# Patient Record
Sex: FEMALE | Race: WHITE | Employment: PART TIME | URBAN - METROPOLITAN AREA
[De-identification: names, ages, dates, MRNs, and addresses within clinical notes are randomized per-mention and may not be internally consistent; named-entity substitution may affect disease eponyms.]

---

## 2019-06-15 ENCOUNTER — APPOINTMENT (OUTPATIENT)
Dept: GENERAL RADIOLOGY | Age: 64
DRG: 192 | End: 2019-06-15
Payer: COMMERCIAL

## 2019-06-15 ENCOUNTER — HOSPITAL ENCOUNTER (INPATIENT)
Age: 64
LOS: 4 days | Discharge: HOME | DRG: 192 | End: 2019-06-19
Attending: EMERGENCY MEDICINE | Admitting: INTERNAL MEDICINE
Payer: COMMERCIAL

## 2019-06-15 DIAGNOSIS — E11.9 TYPE 2 DIABETES MELLITUS WITHOUT COMPLICATION, WITH LONG-TERM CURRENT USE OF INSULIN (HCC): Chronic | ICD-10-CM

## 2019-06-15 DIAGNOSIS — I48.91 ATRIAL FIBRILLATION WITH RAPID VENTRICULAR RESPONSE (HCC): Primary | ICD-10-CM

## 2019-06-15 DIAGNOSIS — Z79.4 TYPE 2 DIABETES MELLITUS WITHOUT COMPLICATION, WITH LONG-TERM CURRENT USE OF INSULIN (HCC): Chronic | ICD-10-CM

## 2019-06-15 PROBLEM — R10.13 EPIGASTRIC PAIN: Status: ACTIVE | Noted: 2019-06-15

## 2019-06-15 PROBLEM — R79.89 ABNORMAL TSH: Status: ACTIVE | Noted: 2019-06-15

## 2019-06-15 LAB
ANION GAP SERPL CALCULATED.3IONS-SCNC: 12 MMOL/L (ref 4–16)
BASOPHILS ABSOLUTE: 0 K/CU MM
BASOPHILS RELATIVE PERCENT: 0.5 % (ref 0–1)
BUN BLDV-MCNC: 12 MG/DL (ref 6–23)
CALCIUM SERPL-MCNC: 9.9 MG/DL (ref 8.3–10.6)
CHLORIDE BLD-SCNC: 105 MMOL/L (ref 99–110)
CO2: 22 MMOL/L (ref 21–32)
CREAT SERPL-MCNC: 0.5 MG/DL (ref 0.6–1.1)
DIFFERENTIAL TYPE: ABNORMAL
EKG ATRIAL RATE: 163 BPM
EKG ATRIAL RATE: 67 BPM
EKG DIAGNOSIS: NORMAL
EKG DIAGNOSIS: NORMAL
EKG P AXIS: 25 DEGREES
EKG P-R INTERVAL: 118 MS
EKG Q-T INTERVAL: 282 MS
EKG Q-T INTERVAL: 370 MS
EKG QRS DURATION: 82 MS
EKG QRS DURATION: 86 MS
EKG QTC CALCULATION (BAZETT): 390 MS
EKG QTC CALCULATION (BAZETT): 471 MS
EKG R AXIS: 12 DEGREES
EKG R AXIS: 22 DEGREES
EKG T AXIS: -16 DEGREES
EKG T AXIS: 22 DEGREES
EKG VENTRICULAR RATE: 168 BPM
EKG VENTRICULAR RATE: 67 BPM
EOSINOPHILS ABSOLUTE: 0.2 K/CU MM
EOSINOPHILS RELATIVE PERCENT: 3.9 % (ref 0–3)
ESTIMATED AVERAGE GLUCOSE: 163 MG/DL
GFR AFRICAN AMERICAN: >60 ML/MIN/1.73M2
GFR NON-AFRICAN AMERICAN: >60 ML/MIN/1.73M2
GLUCOSE BLD-MCNC: 129 MG/DL (ref 70–99)
GLUCOSE BLD-MCNC: 140 MG/DL (ref 70–99)
GLUCOSE BLD-MCNC: 142 MG/DL (ref 70–99)
GLUCOSE BLD-MCNC: 151 MG/DL (ref 70–99)
GLUCOSE BLD-MCNC: 154 MG/DL (ref 70–99)
GLUCOSE BLD-MCNC: 162 MG/DL (ref 70–99)
HBA1C MFR BLD: 7.3 % (ref 4.2–6.3)
HCT VFR BLD CALC: 44.1 % (ref 37–47)
HEMOGLOBIN: 13.9 GM/DL (ref 12.5–16)
IMMATURE NEUTROPHIL %: 0.2 % (ref 0–0.43)
LIPASE: 34 IU/L (ref 13–60)
LYMPHOCYTES ABSOLUTE: 2 K/CU MM
LYMPHOCYTES RELATIVE PERCENT: 34.6 % (ref 24–44)
MCH RBC QN AUTO: 26.6 PG (ref 27–31)
MCHC RBC AUTO-ENTMCNC: 31.5 % (ref 32–36)
MCV RBC AUTO: 84.3 FL (ref 78–100)
MONOCYTES ABSOLUTE: 0.9 K/CU MM
MONOCYTES RELATIVE PERCENT: 15.1 % (ref 0–4)
NUCLEATED RBC %: 0 %
PDW BLD-RTO: 12.6 % (ref 11.7–14.9)
PLATELET # BLD: 221 K/CU MM (ref 140–440)
PMV BLD AUTO: 9 FL (ref 7.5–11.1)
POTASSIUM SERPL-SCNC: 3.9 MMOL/L (ref 3.5–5.1)
RBC # BLD: 5.23 M/CU MM (ref 4.2–5.4)
SEGMENTED NEUTROPHILS ABSOLUTE COUNT: 2.7 K/CU MM
SEGMENTED NEUTROPHILS RELATIVE PERCENT: 45.7 % (ref 36–66)
SODIUM BLD-SCNC: 139 MMOL/L (ref 135–145)
T4 FREE: 1.29 NG/DL (ref 0.9–1.8)
TOTAL IMMATURE NEUTOROPHIL: 0.01 K/CU MM
TOTAL NUCLEATED RBC: 0 K/CU MM
TROPONIN T: <0.01 NG/ML
TSH HIGH SENSITIVITY: <0.01 UIU/ML (ref 0.27–4.2)
WBC # BLD: 5.8 K/CU MM (ref 4–10.5)

## 2019-06-15 PROCEDURE — 93010 ELECTROCARDIOGRAM REPORT: CPT | Performed by: INTERNAL MEDICINE

## 2019-06-15 PROCEDURE — 99285 EMERGENCY DEPT VISIT HI MDM: CPT

## 2019-06-15 PROCEDURE — 2580000003 HC RX 258: Performed by: EMERGENCY MEDICINE

## 2019-06-15 PROCEDURE — 36415 COLL VENOUS BLD VENIPUNCTURE: CPT

## 2019-06-15 PROCEDURE — 83036 HEMOGLOBIN GLYCOSYLATED A1C: CPT

## 2019-06-15 PROCEDURE — 6370000000 HC RX 637 (ALT 250 FOR IP): Performed by: INTERNAL MEDICINE

## 2019-06-15 PROCEDURE — 84443 ASSAY THYROID STIM HORMONE: CPT

## 2019-06-15 PROCEDURE — 82962 GLUCOSE BLOOD TEST: CPT

## 2019-06-15 PROCEDURE — 83690 ASSAY OF LIPASE: CPT

## 2019-06-15 PROCEDURE — 6360000002 HC RX W HCPCS: Performed by: INTERNAL MEDICINE

## 2019-06-15 PROCEDURE — 71045 X-RAY EXAM CHEST 1 VIEW: CPT

## 2019-06-15 PROCEDURE — 96366 THER/PROPH/DIAG IV INF ADDON: CPT

## 2019-06-15 PROCEDURE — 93005 ELECTROCARDIOGRAM TRACING: CPT | Performed by: INTERNAL MEDICINE

## 2019-06-15 PROCEDURE — 2140000000 HC CCU INTERMEDIATE R&B

## 2019-06-15 PROCEDURE — 85025 COMPLETE CBC W/AUTO DIFF WBC: CPT

## 2019-06-15 PROCEDURE — 6370000000 HC RX 637 (ALT 250 FOR IP): Performed by: EMERGENCY MEDICINE

## 2019-06-15 PROCEDURE — 2500000003 HC RX 250 WO HCPCS: Performed by: EMERGENCY MEDICINE

## 2019-06-15 PROCEDURE — 99223 1ST HOSP IP/OBS HIGH 75: CPT | Performed by: INTERNAL MEDICINE

## 2019-06-15 PROCEDURE — 93005 ELECTROCARDIOGRAM TRACING: CPT | Performed by: EMERGENCY MEDICINE

## 2019-06-15 PROCEDURE — 96365 THER/PROPH/DIAG IV INF INIT: CPT

## 2019-06-15 PROCEDURE — 80048 BASIC METABOLIC PNL TOTAL CA: CPT

## 2019-06-15 PROCEDURE — 84439 ASSAY OF FREE THYROXINE: CPT

## 2019-06-15 PROCEDURE — 99221 1ST HOSP IP/OBS SF/LOW 40: CPT | Performed by: INTERNAL MEDICINE

## 2019-06-15 PROCEDURE — 2580000003 HC RX 258: Performed by: INTERNAL MEDICINE

## 2019-06-15 PROCEDURE — 84484 ASSAY OF TROPONIN QUANT: CPT

## 2019-06-15 PROCEDURE — 6370000000 HC RX 637 (ALT 250 FOR IP): Performed by: SPECIALIST

## 2019-06-15 RX ORDER — FENOFIBRATE 134 MG/1
134 CAPSULE ORAL
Status: DISCONTINUED | OUTPATIENT
Start: 2019-06-16 | End: 2019-06-19 | Stop reason: HOSPADM

## 2019-06-15 RX ORDER — NICOTINE POLACRILEX 4 MG
15 LOZENGE BUCCAL PRN
Status: DISCONTINUED | OUTPATIENT
Start: 2019-06-15 | End: 2019-06-19 | Stop reason: HOSPADM

## 2019-06-15 RX ORDER — DICYCLOMINE HYDROCHLORIDE 10 MG/1
20 CAPSULE ORAL 3 TIMES DAILY PRN
Status: DISCONTINUED | OUTPATIENT
Start: 2019-06-15 | End: 2019-06-19 | Stop reason: HOSPADM

## 2019-06-15 RX ORDER — CALCIUM POLYCARBOPHIL 625 MG 625 MG/1
625 TABLET ORAL DAILY
Status: DISCONTINUED | OUTPATIENT
Start: 2019-06-15 | End: 2019-06-19 | Stop reason: HOSPADM

## 2019-06-15 RX ORDER — DILTIAZEM HYDROCHLORIDE 180 MG/1
180 CAPSULE, COATED, EXTENDED RELEASE ORAL DAILY
Status: DISCONTINUED | OUTPATIENT
Start: 2019-06-15 | End: 2019-06-17

## 2019-06-15 RX ORDER — FENOFIBRATE 160 MG/1
160 TABLET ORAL DAILY
Status: DISCONTINUED | OUTPATIENT
Start: 2019-06-15 | End: 2019-06-15 | Stop reason: CLARIF

## 2019-06-15 RX ORDER — DEXTROSE MONOHYDRATE 50 MG/ML
100 INJECTION, SOLUTION INTRAVENOUS PRN
Status: DISCONTINUED | OUTPATIENT
Start: 2019-06-15 | End: 2019-06-19 | Stop reason: HOSPADM

## 2019-06-15 RX ORDER — PROMETHAZINE HYDROCHLORIDE 25 MG/ML
12.5 INJECTION, SOLUTION INTRAMUSCULAR; INTRAVENOUS EVERY 6 HOURS PRN
Status: DISCONTINUED | OUTPATIENT
Start: 2019-06-15 | End: 2019-06-19 | Stop reason: HOSPADM

## 2019-06-15 RX ORDER — PANTOPRAZOLE SODIUM 40 MG/1
40 TABLET, DELAYED RELEASE ORAL
Status: DISCONTINUED | OUTPATIENT
Start: 2019-06-15 | End: 2019-06-19 | Stop reason: HOSPADM

## 2019-06-15 RX ORDER — DOCUSATE SODIUM 100 MG/1
100 CAPSULE, LIQUID FILLED ORAL DAILY
Status: DISCONTINUED | OUTPATIENT
Start: 2019-06-15 | End: 2019-06-19 | Stop reason: HOSPADM

## 2019-06-15 RX ORDER — DILTIAZEM HYDROCHLORIDE 5 MG/ML
20 INJECTION INTRAVENOUS ONCE
Status: COMPLETED | OUTPATIENT
Start: 2019-06-15 | End: 2019-06-15

## 2019-06-15 RX ORDER — OMEGA-3-ACID ETHYL ESTERS 1 G/1
2 CAPSULE, LIQUID FILLED ORAL 2 TIMES DAILY
Status: DISCONTINUED | OUTPATIENT
Start: 2019-06-15 | End: 2019-06-18

## 2019-06-15 RX ORDER — TRAMADOL HYDROCHLORIDE 50 MG/1
50 TABLET ORAL EVERY 6 HOURS PRN
Status: DISCONTINUED | OUTPATIENT
Start: 2019-06-15 | End: 2019-06-19 | Stop reason: HOSPADM

## 2019-06-15 RX ORDER — DILTIAZEM HYDROCHLORIDE 5 MG/ML
30 INJECTION INTRAVENOUS ONCE
Status: COMPLETED | OUTPATIENT
Start: 2019-06-15 | End: 2019-06-15

## 2019-06-15 RX ORDER — ONDANSETRON 2 MG/ML
4 INJECTION INTRAMUSCULAR; INTRAVENOUS EVERY 6 HOURS PRN
Status: DISCONTINUED | OUTPATIENT
Start: 2019-06-15 | End: 2019-06-19 | Stop reason: HOSPADM

## 2019-06-15 RX ORDER — HYDRALAZINE HYDROCHLORIDE 20 MG/ML
5 INJECTION INTRAMUSCULAR; INTRAVENOUS EVERY 6 HOURS PRN
Status: DISCONTINUED | OUTPATIENT
Start: 2019-06-15 | End: 2019-06-16

## 2019-06-15 RX ORDER — ASPIRIN 81 MG/1
81 TABLET ORAL DAILY
Status: DISCONTINUED | OUTPATIENT
Start: 2019-06-15 | End: 2019-06-19 | Stop reason: HOSPADM

## 2019-06-15 RX ORDER — ATORVASTATIN CALCIUM 20 MG/1
20 TABLET, FILM COATED ORAL DAILY
Status: DISCONTINUED | OUTPATIENT
Start: 2019-06-15 | End: 2019-06-19 | Stop reason: HOSPADM

## 2019-06-15 RX ORDER — DEXTROSE MONOHYDRATE 25 G/50ML
12.5 INJECTION, SOLUTION INTRAVENOUS PRN
Status: DISCONTINUED | OUTPATIENT
Start: 2019-06-15 | End: 2019-06-19 | Stop reason: HOSPADM

## 2019-06-15 RX ORDER — SODIUM CHLORIDE 0.9 % (FLUSH) 0.9 %
10 SYRINGE (ML) INJECTION PRN
Status: DISCONTINUED | OUTPATIENT
Start: 2019-06-15 | End: 2019-06-19 | Stop reason: HOSPADM

## 2019-06-15 RX ORDER — DILTIAZEM HYDROCHLORIDE 5 MG/ML
INJECTION INTRAVENOUS
Status: DISPENSED
Start: 2019-06-15 | End: 2019-06-15

## 2019-06-15 RX ORDER — MORPHINE SULFATE 4 MG/ML
1 INJECTION, SOLUTION INTRAMUSCULAR; INTRAVENOUS EVERY 8 HOURS PRN
Status: DISCONTINUED | OUTPATIENT
Start: 2019-06-15 | End: 2019-06-19 | Stop reason: HOSPADM

## 2019-06-15 RX ORDER — ZINC SULFATE 50(220)MG
220 CAPSULE ORAL DAILY
Status: DISCONTINUED | OUTPATIENT
Start: 2019-06-15 | End: 2019-06-19 | Stop reason: HOSPADM

## 2019-06-15 RX ORDER — ACETAMINOPHEN 325 MG/1
325 TABLET ORAL EVERY 6 HOURS PRN
Status: DISCONTINUED | OUTPATIENT
Start: 2019-06-15 | End: 2019-06-17 | Stop reason: SDUPTHER

## 2019-06-15 RX ORDER — LANOLIN ALCOHOL/MO/W.PET/CERES
1000 CREAM (GRAM) TOPICAL NIGHTLY
Status: DISCONTINUED | OUTPATIENT
Start: 2019-06-15 | End: 2019-06-18

## 2019-06-15 RX ORDER — LISINOPRIL AND HYDROCHLOROTHIAZIDE 12.5; 1 MG/1; MG/1
1 TABLET ORAL DAILY
Status: DISCONTINUED | OUTPATIENT
Start: 2019-06-15 | End: 2019-06-17

## 2019-06-15 RX ORDER — MAGNESIUM HYDROXIDE/ALUMINUM HYDROXICE/SIMETHICONE 120; 1200; 1200 MG/30ML; MG/30ML; MG/30ML
30 SUSPENSION ORAL ONCE
Status: COMPLETED | OUTPATIENT
Start: 2019-06-15 | End: 2019-06-15

## 2019-06-15 RX ORDER — FLUTICASONE PROPIONATE 50 MCG
2 SPRAY, SUSPENSION (ML) NASAL DAILY
Status: DISCONTINUED | OUTPATIENT
Start: 2019-06-15 | End: 2019-06-19 | Stop reason: HOSPADM

## 2019-06-15 RX ORDER — DILTIAZEM HYDROCHLORIDE 5 MG/ML
20 INJECTION INTRAVENOUS ONCE
Status: DISCONTINUED | OUTPATIENT
Start: 2019-06-15 | End: 2019-06-15 | Stop reason: SDUPTHER

## 2019-06-15 RX ORDER — INSULIN GLARGINE 100 [IU]/ML
12 INJECTION, SOLUTION SUBCUTANEOUS 2 TIMES DAILY
Status: DISCONTINUED | OUTPATIENT
Start: 2019-06-15 | End: 2019-06-19 | Stop reason: HOSPADM

## 2019-06-15 RX ORDER — DIPHENHYDRAMINE HCL 25 MG
25 TABLET ORAL EVERY 6 HOURS PRN
Status: DISCONTINUED | OUTPATIENT
Start: 2019-06-15 | End: 2019-06-19 | Stop reason: HOSPADM

## 2019-06-15 RX ORDER — SODIUM CHLORIDE 0.9 % (FLUSH) 0.9 %
10 SYRINGE (ML) INJECTION EVERY 12 HOURS SCHEDULED
Status: DISCONTINUED | OUTPATIENT
Start: 2019-06-15 | End: 2019-06-19 | Stop reason: HOSPADM

## 2019-06-15 RX ADMIN — DILTIAZEM HYDROCHLORIDE 20 MG: 5 INJECTION INTRAVENOUS at 03:50

## 2019-06-15 RX ADMIN — ZINC SULFATE 220 MG (50 MG) CAPSULE 220 MG: CAPSULE at 08:40

## 2019-06-15 RX ADMIN — ATORVASTATIN CALCIUM 20 MG: 20 TABLET, FILM COATED ORAL at 21:25

## 2019-06-15 RX ADMIN — TRAMADOL HYDROCHLORIDE 50 MG: 50 TABLET, FILM COATED ORAL at 22:31

## 2019-06-15 RX ADMIN — DICYCLOMINE HYDROCHLORIDE 20 MG: 10 CAPSULE ORAL at 21:40

## 2019-06-15 RX ADMIN — ASPIRIN 81 MG: 81 TABLET ORAL at 21:25

## 2019-06-15 RX ADMIN — TRAMADOL HYDROCHLORIDE 50 MG: 50 TABLET, FILM COATED ORAL at 16:19

## 2019-06-15 RX ADMIN — ACETAMINOPHEN 325 MG: 325 TABLET ORAL at 08:40

## 2019-06-15 RX ADMIN — INSULIN GLARGINE 12 UNITS: 100 INJECTION, SOLUTION SUBCUTANEOUS at 10:45

## 2019-06-15 RX ADMIN — TRAMADOL HYDROCHLORIDE 50 MG: 50 TABLET, FILM COATED ORAL at 10:45

## 2019-06-15 RX ADMIN — ENOXAPARIN SODIUM 100 MG: 100 INJECTION SUBCUTANEOUS at 21:16

## 2019-06-15 RX ADMIN — ENOXAPARIN SODIUM 100 MG: 100 INJECTION SUBCUTANEOUS at 08:40

## 2019-06-15 RX ADMIN — DIPHENHYDRAMINE HCL 25 MG: 25 TABLET ORAL at 05:14

## 2019-06-15 RX ADMIN — DILTIAZEM HYDROCHLORIDE 180 MG: 180 CAPSULE, COATED, EXTENDED RELEASE ORAL at 08:40

## 2019-06-15 RX ADMIN — CALCIUM POLYCARBOPHIL 625 MG TABLET 625 MG: at 08:40

## 2019-06-15 RX ADMIN — HYOSCYAMINE SULFATE 125 MCG: 0.12 TABLET ORAL; SUBLINGUAL at 23:23

## 2019-06-15 RX ADMIN — ACETAMINOPHEN 325 MG: 325 TABLET ORAL at 15:02

## 2019-06-15 RX ADMIN — ONDANSETRON 4 MG: 2 INJECTION INTRAMUSCULAR; INTRAVENOUS at 22:15

## 2019-06-15 RX ADMIN — INSULIN LISPRO 1 UNITS: 100 INJECTION, SOLUTION INTRAVENOUS; SUBCUTANEOUS at 16:45

## 2019-06-15 RX ADMIN — INSULIN LISPRO 1 UNITS: 100 INJECTION, SOLUTION INTRAVENOUS; SUBCUTANEOUS at 08:52

## 2019-06-15 RX ADMIN — DILTIAZEM HYDROCHLORIDE 30 MG: 5 INJECTION INTRAVENOUS at 04:19

## 2019-06-15 RX ADMIN — PANTOPRAZOLE SODIUM 40 MG: 40 TABLET, DELAYED RELEASE ORAL at 08:40

## 2019-06-15 RX ADMIN — ALUMINUM HYDROXIDE, MAGNESIUM HYDROXIDE, AND SIMETHICONE 30 ML: 200; 200; 20 SUSPENSION ORAL at 23:08

## 2019-06-15 RX ADMIN — DILTIAZEM HYDROCHLORIDE 5 MG/HR: 5 INJECTION INTRAVENOUS at 03:56

## 2019-06-15 RX ADMIN — SODIUM CHLORIDE, PRESERVATIVE FREE 10 ML: 5 INJECTION INTRAVENOUS at 21:43

## 2019-06-15 RX ADMIN — MORPHINE SULFATE 1 MG: 4 INJECTION INTRAVENOUS at 21:29

## 2019-06-15 RX ADMIN — DOCUSATE SODIUM 100 MG: 100 CAPSULE, LIQUID FILLED ORAL at 21:00

## 2019-06-15 RX ADMIN — INSULIN GLARGINE 12 UNITS: 100 INJECTION, SOLUTION SUBCUTANEOUS at 21:30

## 2019-06-15 ASSESSMENT — PAIN SCALES - GENERAL
PAINLEVEL_OUTOF10: 8
PAINLEVEL_OUTOF10: 7
PAINLEVEL_OUTOF10: 8
PAINLEVEL_OUTOF10: 6
PAINLEVEL_OUTOF10: 5
PAINLEVEL_OUTOF10: 9

## 2019-06-15 ASSESSMENT — PAIN DESCRIPTION - LOCATION: LOCATION: CHEST

## 2019-06-15 NOTE — ED TRIAGE NOTES
Patient arrived via EMS for A-fib and chest tightness.   Patient stated that it woke her up from her sleep and that she has a history of a-fib

## 2019-06-15 NOTE — PROGRESS NOTES
2 person skin assessment completed with this nurse and [facility], RN. No areas of skin impairment noted at this time.

## 2019-06-15 NOTE — H&P
fluticasone (FLONASE) 50 MCG/ACT nasal spray 2 sprays by Nasal route daily 1/29/16   Fanta Matos MD   docusate (COLACE, DULCOLAX) 100 MG CAPS Take 100 mg by mouth daily 1/29/16   Fanta Matos MD   insulin detemir (LEVEMIR) 100 UNIT/ML injection pen Inject 50 Units into the skin nightly 1/29/16   Fanta Matos MD   lisinopril-hydrochlorothiazide (PRINZIDE;ZESTORETIC) 10-12.5 MG per tablet Take 1 tablet by mouth daily    Historical Provider, MD   metFORMIN (GLUCOPHAGE) 500 MG tablet Take 1 tablet by mouth 2 times daily (with meals). 3/18/15   April Small MD   atorvastatin (LIPITOR) 20 MG tablet Take 1 tablet by mouth daily. 3/18/15   April Small MD   omeprazole (PRILOSEC) 20 MG capsule Take 20 mg by mouth daily. Historical Provider, MD   polycarbophil (FIBERCON) 625 MG tablet Take 625 mg by mouth daily. Historical Provider, MD   Zinc 50 MG CAPS Take 1 capsule by mouth daily. Historical Provider, MD   Niacin 1000 MG TBCR Take 1 tablet by mouth nightly     Historical Provider, MD   Acetaminophen (TYLENOL ARTHRITIS EXT RELIEF PO) Take 650 mg by mouth 2 times daily. Historical Provider, MD   aspirin 81 MG EC tablet Take 81 mg by mouth daily. Last dose 8-15-12    Historical Provider, MD   choline fenofibrate (TRILIPIX) 135 MG CPDR Take 160 mg by mouth daily     Historical Provider, MD       Allergies:  Cephalexin; Hydromorphone; Keflex [cephalexin]; Percocet [oxycodone-acetaminophen]; Sulfa antibiotics; Unable to assess; and Vicodin [hydrocodone-acetaminophen]    Social History:   TOBACCO:   reports that she has never smoked. She does not have any smokeless tobacco history on file. ETOH:   reports that she does not drink alcohol.         Family History:       Problem Relation Age of Onset    Dementia Mother     High Cholesterol Father     Cancer Father        REVIEW OF SYSTEMS:  Negative except for above    Physical Exam:    Vitals: /75   Pulse 66   Temp 98 °F (36.7 °C) (Oral) Resp 16   Ht 5' 9\" (1.753 m)   Wt 210 lb 6.4 oz (95.4 kg)   SpO2 95%   BMI 31.07 kg/m²   General appearance: alert, appears stated age and cooperative  Skin: Skin color, texture, turgor normal. No rashes or lesions  HEENT: Head: Normocephalic, no lesions, without obvious abnormality. Eye: Normal external eye, conjunctiva, lids cornea, JARED. Nose: Normal external nose, mucus membranes and septum. Pharynx: Dental Hygiene adequate. Normal buccal mucosa. Normal pharynx. HAS SWELLING BILAT SUBMANDIBULAR REGIONS, ? POSSIBLY FR HX OF SIALOADENITIS PER PRIOR ENT EVAL    Lungs: clear to auscultation bilaterally  Heart: regular rate and rhythm, S1, S2 normal, no murmur, click, rub or gallop  Abdomen: soft, non-tender; bowel sounds normal; no masses,  no organomegaly  Extremities: extremities normal, atraumatic, no cyanosis or edema  Neurologic: Mental status: Alert, oriented, thought content appropriate    CBC:   Recent Labs     06/15/19  0338   WBC 5.8   HGB 13.9        BMP:    Recent Labs     06/15/19  0338      K 3.9      CO2 22   BUN 12   CREATININE 0.5*   GLUCOSE 162*     Hepatic: No results for input(s): AST, ALT, ALB, BILITOT, ALKPHOS in the last 72 hours. Troponin: No results for input(s): TROPONINI in the last 72 hours. BNP: No results for input(s): BNP in the last 72 hours. Lipids: No results for input(s): CHOL, HDL in the last 72 hours. Invalid input(s): LDLCALCU  ABGs: No results found for: PHART, PO2ART, KGV9NFE  INR: No results for input(s): INR in the last 72 hours.   -----------------------------------------------------------------       Assessment and Plan     Patient Active Problem List   Diagnosis Code    DM type 2 (diabetes mellitus, type 2) ([facility] E11.9    Hypercholesterolemia E78.00    HTN (hypertension) I10    Idiopathic acute pancreatitis K85.00    Acute pancreatitis K85.90    Type 2 diabetes mellitus without complication ([facility] J88.3    Essential hypertension I10

## 2019-06-15 NOTE — CONSULTS
Vomiting    Sulfa Antibiotics Hives    Unable To Assess Nausea And Vomiting     States pain medications cause severe vomiting      Vicodin [Hydrocodone-Acetaminophen] Nausea And Vomiting         diltiazem 125 mg in dextrose 5 % 125 mL infusion Continuous   diphenhydrAMINE (BENADRYL) tablet 25 mg Q6H PRN   aspirin EC tablet 81 mg Daily   atorvastatin (LIPITOR) tablet 20 mg Daily   fenofibrate tablet 160 mg Daily   docusate sodium (COLACE) capsule 100 mg Daily   fluticasone (FLONASE) 50 MCG/ACT nasal spray 2 spray Daily   insulin detemir (LEVEMIR) injection vial 50 Units Nightly   insulin lispro (HUMALOG) injection vial 15 Units TID    lisinopril-hydrochlorothiazide (PRINZIDE;ZESTORETIC) 10-12.5 MG per tablet 1 tablet Daily   niacin (SLO-NIACIN) extended release tablet 1,000 mg Nightly   omega-3 acid ethyl esters (LOVAZA) capsule 2 g BID   pantoprazole (PROTONIX) tablet 40 mg QAM AC   polycarbophil (FIBERCON) tablet 625 mg Daily   zinc sulfate (ZINCATE) capsule 220 mg Daily   sodium chloride flush 0.9 % injection 10 mL 2 times per day   sodium chloride flush 0.9 % injection 10 mL PRN   magnesium hydroxide (MILK OF MAGNESIA) 400 MG/5ML suspension 30 mL Daily PRN   ondansetron (ZOFRAN) injection 4 mg Q6H PRN   glucose (GLUTOSE) 40 % oral gel 15 g PRN   dextrose 50 % IV solution PRN   glucagon (rDNA) injection 1 mg PRN   dextrose 5 % solution PRN   enoxaparin (LOVENOX) injection 100 mg BID   insulin lispro (HUMALOG) injection vial 0-6 Units TID    insulin lispro (HUMALOG) injection vial 0-3 Units Nightly   acetaminophen (TYLENOL) tablet 325 mg Q6H PRN     Current Facility-Administered Medications   Medication Dose Route Frequency Provider Last Rate Last Dose    diltiazem 125 mg in dextrose 5 % 125 mL infusion  5 mg/hr Intravenous Continuous Vance Parry MD 7.5 mL/hr at 06/15/19 0735 7.5 mg/hr at 06/15/19 0735    diphenhydrAMINE (BENADRYL) tablet 25 mg  25 mg Oral Q6H PRN Sathya Delong MD   25 mg at 06/15/19 solution  100 mL/hr Intravenous PRN Andrea Francisco MD        enoxaparin (LOVENOX) injection 100 mg  1 mg/kg Subcutaneous BID Andrea Francisco MD        insulin lispro (HUMALOG) injection vial 0-6 Units  0-6 Units Subcutaneous TID WC Andrea Francisco MD        insulin lispro (HUMALOG) injection vial 0-3 Units  0-3 Units Subcutaneous Nightly Andrea Francisco MD        acetaminophen (TYLENOL) tablet 325 mg  325 mg Oral Q6H PRN Andrea Francisco MD         Review of Systems:   · Constitutional: No Fever or Weight Loss   · Eyes: No Decreased Vision  · ENT: No Headaches, Hearing Loss or Vertigo  · Cardiovascular: As per HPI  · Respiratory: As per HPI  · Gastrointestinal:has h/o pancreatitis  No abdominal pain, appetite loss, blood in stools, constipation, diarrhea or heartburn  · Genitourinary: No dysuria, trouble voiding, or hematuria  · Musculoskeletal:  No gait disturbance, weakness or joint complaints  · Integumentary: No rash or pruritis  · Neurological: No TIA or stroke symptoms  · Psychiatric: No anxiety or depression  · Endocrine: No malaise, fatigue or temperature intolerance  · Hematologic/Lymphatic: No bleeding problems, blood clots or swollen lymph nodes  · Allergic/Immunologic: No nasal congestion or hives  All systems negative except as marked. Physical Examination:    Vitals:    06/15/19 0433 06/15/19 0519 06/15/19 0533 06/15/19 0600   BP: (!) 140/85 114/85 123/78 109/75   Pulse: 121 119 124 66   Resp: 21 28 22 16   Temp:    98 °F (36.7 °C)   TempSrc:    Oral   SpO2: 97% 97% 95%    Weight:    210 lb 6.4 oz (95.4 kg)   Height:    5' 9\" (1.753 m)       General Appearance:  No distress, conversant    Constitutional:  Well developed, Well nourished, No acute distress, Non-toxic appearance.    HENT:  Normocephalic, Atraumatic, Bilateral external ears normal, Oropharynx moist, No oral exudates, Nose normal. Neck- Normal range of motion, No tenderness, Supple, No stridor,no apical-carotid delay  Lymphatics : no palpable lymph nodes  Eyes:  PERRL, EOMI, Conjunctiva normal, No discharge. Respiratory:  Normal breath sounds, No respiratory distress, No wheezing, No chest tenderness. ,no use of accessory muscles, crackles Absent   Cardiovascular: (PMI) apex non displaced,no lifts no thrills, ankle swelling Absent  , 1+, s1 and s2 audible,Murmur. Absent , JVD not noted    Abdomen /GI:  Bowel sounds normal, Soft, No tenderness, No masses, No gross visceromegaly   :  No costovertebral angle tenderness   Musculoskeletal:  No edema, no tenderness, no deformities. Back- no tenderness  Integument:  Well hydrated, no rash   Lymphatic:  No lymphadenopathy noted   Neurologic:  Alert & oriented x 3, CN 2-12 normal, normal motor function, normal sensory function, no focal deficits noted           Medical decision making and Data review:    Lab Review   Recent Labs     06/15/19  0338   WBC 5.8   HGB 13.9   HCT 44.1         Recent Labs     06/15/19  0338      K 3.9      CO2 22   BUN 12   CREATININE 0.5*     No results for input(s): AST, ALT, ALB, BILIDIR, BILITOT, ALKPHOS in the last 72 hours. Recent Labs     06/15/19  0338   TROPONINT <0.010       No results for input(s): PROBNP in the last 72 hours. Lab Results   Component Value Date    INR 1.07 01/19/2016    PROTIME 12.2 01/19/2016       EKG: (reviewed by myself)    ECHO:(reviewed by myself)    Chest Xray:(reviewed by myself)  Xr Chest Portable    Result Date: 6/15/2019  EXAMINATION: ONE XRAY VIEW OF THE CHEST 6/15/2019 3:43 am COMPARISON: 01/24/2016. HISTORY: ORDERING SYSTEM PROVIDED HISTORY: chest pain TECHNOLOGIST PROVIDED HISTORY: Reason for exam:->chest pain Ordering Physician Provided Reason for Exam: chest pain Acuity: Unknown Type of Exam: Unknown FINDINGS: Lungs are clear. Cardiac and mediastinal silhouettes are within normal limits. No pneumothoraces. Bony structures appear intact.      No evidence for acute cardiopulmonary process. All labs, medications and tests reviewed by myself including data  from outside source , patient and available family . Continue all other medications of all above medical condition listed as is. Impression:  Principal Problem:    Atrial fibrillation with RVR (HCC)  Active Problems:    DM type 2 (diabetes mellitus, type 2) ([facility]  Resolved Problems:    * No resolved hospital problems. *      Assessment: 58 y. o.year old with PMH of  has a past medical history of Arrhythmia, Arrhythmia, Atrial fibrillation ([facility] Back injury, Diabetes mellitus ([facility] Hypercholesteremia, Hyperlipidemia, Hypertension, Lumbar herniated disc, Nausea & vomiting, and Thyroid nodule. Plan and Recommendations:    Afib: in sinus now. Start po cardizem cd she has been intolerant of beta blockers with hr lowering too much in past   Check echo , stress and sleep apnea ( can be done as outpatient), check tsh   Chads vasc of 3 start eliquis we discussed risk vs benefit of anti coagulation . She is in agreement , stop aspirin unless stress test comes back abnormal   We will hold off on antiarrhythmic until we know the results of her stress test  DVT prophylaxis if no contraindication  6. Dyslipidemia: continue statins   Follow-up with Dr.Ashfaq Camacho and Raffy Almanzar ( I am covering their service / on call )  Discussed with patient in detail,          Thank you  much for consult and giving us the opportunity in contributing in the care of this patient. Please feel free to call me for any questions.        Pranay Alegre MD, 6/15/2019 8:18 AM

## 2019-06-15 NOTE — ED NOTES
Bed: ED-30  Expected date:   Expected time:   Means of arrival:   Comments:  ems     Zachary Vu RN  06/15/19 2151

## 2019-06-15 NOTE — ED NOTES
Called pharmacy to verify cardizem bolus. States is going to reorder it.       Facundo Cruz, MALLORIE  06/15/19 9314
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
## 2019-06-16 LAB
ALBUMIN SERPL-MCNC: 4.2 GM/DL (ref 3.4–5)
ALP BLD-CCNC: 83 IU/L (ref 40–128)
ALT SERPL-CCNC: 35 U/L (ref 10–40)
AMYLASE: 46 U/L (ref 25–115)
ANION GAP SERPL CALCULATED.3IONS-SCNC: 13 MMOL/L (ref 4–16)
AST SERPL-CCNC: 27 IU/L (ref 15–37)
BASOPHILS ABSOLUTE: 0 K/CU MM
BASOPHILS RELATIVE PERCENT: 0.2 % (ref 0–1)
BILIRUB SERPL-MCNC: 0.4 MG/DL (ref 0–1)
BUN BLDV-MCNC: 15 MG/DL (ref 6–23)
CALCIUM SERPL-MCNC: 9.2 MG/DL (ref 8.3–10.6)
CHLORIDE BLD-SCNC: 103 MMOL/L (ref 99–110)
CO2: 23 MMOL/L (ref 21–32)
CREAT SERPL-MCNC: 0.6 MG/DL (ref 0.6–1.1)
DIFFERENTIAL TYPE: ABNORMAL
EKG ATRIAL RATE: 62 BPM
EKG ATRIAL RATE: 66 BPM
EKG DIAGNOSIS: NORMAL
EKG DIAGNOSIS: NORMAL
EKG P AXIS: 21 DEGREES
EKG P AXIS: 26 DEGREES
EKG P-R INTERVAL: 146 MS
EKG P-R INTERVAL: 152 MS
EKG Q-T INTERVAL: 400 MS
EKG Q-T INTERVAL: 416 MS
EKG QRS DURATION: 84 MS
EKG QRS DURATION: 84 MS
EKG QTC CALCULATION (BAZETT): 406 MS
EKG QTC CALCULATION (BAZETT): 436 MS
EKG R AXIS: 25 DEGREES
EKG R AXIS: 28 DEGREES
EKG T AXIS: 24 DEGREES
EKG T AXIS: 28 DEGREES
EKG VENTRICULAR RATE: 62 BPM
EKG VENTRICULAR RATE: 66 BPM
EOSINOPHILS ABSOLUTE: 0 K/CU MM
EOSINOPHILS RELATIVE PERCENT: 0.5 % (ref 0–3)
GFR AFRICAN AMERICAN: >60 ML/MIN/1.73M2
GFR NON-AFRICAN AMERICAN: >60 ML/MIN/1.73M2
GLUCOSE BLD-MCNC: 120 MG/DL (ref 70–99)
GLUCOSE BLD-MCNC: 142 MG/DL (ref 70–99)
GLUCOSE BLD-MCNC: 161 MG/DL (ref 70–99)
GLUCOSE BLD-MCNC: 176 MG/DL (ref 70–99)
GLUCOSE BLD-MCNC: 217 MG/DL (ref 70–99)
HCT VFR BLD CALC: 41.2 % (ref 37–47)
HEMOGLOBIN: 12.8 GM/DL (ref 12.5–16)
IMMATURE NEUTROPHIL %: 0.1 % (ref 0–0.43)
LIPASE: 21 IU/L (ref 13–60)
LYMPHOCYTES ABSOLUTE: 1.6 K/CU MM
LYMPHOCYTES RELATIVE PERCENT: 18.8 % (ref 24–44)
MCH RBC QN AUTO: 26.6 PG (ref 27–31)
MCHC RBC AUTO-ENTMCNC: 31.1 % (ref 32–36)
MCV RBC AUTO: 85.5 FL (ref 78–100)
MONOCYTES ABSOLUTE: 0.8 K/CU MM
MONOCYTES RELATIVE PERCENT: 9.6 % (ref 0–4)
NUCLEATED RBC %: 0 %
PDW BLD-RTO: 12.9 % (ref 11.7–14.9)
PLATELET # BLD: 202 K/CU MM (ref 140–440)
PMV BLD AUTO: 8.9 FL (ref 7.5–11.1)
POTASSIUM SERPL-SCNC: 4 MMOL/L (ref 3.5–5.1)
RBC # BLD: 4.82 M/CU MM (ref 4.2–5.4)
SEGMENTED NEUTROPHILS ABSOLUTE COUNT: 5.9 K/CU MM
SEGMENTED NEUTROPHILS RELATIVE PERCENT: 70.8 % (ref 36–66)
SODIUM BLD-SCNC: 139 MMOL/L (ref 135–145)
TOTAL IMMATURE NEUTOROPHIL: 0.01 K/CU MM
TOTAL NUCLEATED RBC: 0 K/CU MM
TOTAL PROTEIN: 6.8 GM/DL (ref 6.4–8.2)
TROPONIN T: <0.01 NG/ML
TSH HIGH SENSITIVITY: <0.01 UIU/ML (ref 0.27–4.2)
WBC # BLD: 8.3 K/CU MM (ref 4–10.5)

## 2019-06-16 PROCEDURE — 36415 COLL VENOUS BLD VENIPUNCTURE: CPT

## 2019-06-16 PROCEDURE — 6360000002 HC RX W HCPCS: Performed by: INTERNAL MEDICINE

## 2019-06-16 PROCEDURE — 83690 ASSAY OF LIPASE: CPT

## 2019-06-16 PROCEDURE — 85025 COMPLETE CBC W/AUTO DIFF WBC: CPT

## 2019-06-16 PROCEDURE — 84443 ASSAY THYROID STIM HORMONE: CPT

## 2019-06-16 PROCEDURE — 2140000000 HC CCU INTERMEDIATE R&B

## 2019-06-16 PROCEDURE — 80053 COMPREHEN METABOLIC PANEL: CPT

## 2019-06-16 PROCEDURE — 99232 SBSQ HOSP IP/OBS MODERATE 35: CPT | Performed by: INTERNAL MEDICINE

## 2019-06-16 PROCEDURE — 82962 GLUCOSE BLOOD TEST: CPT

## 2019-06-16 PROCEDURE — 84484 ASSAY OF TROPONIN QUANT: CPT

## 2019-06-16 PROCEDURE — 6370000000 HC RX 637 (ALT 250 FOR IP): Performed by: INTERNAL MEDICINE

## 2019-06-16 PROCEDURE — 82150 ASSAY OF AMYLASE: CPT

## 2019-06-16 PROCEDURE — 93010 ELECTROCARDIOGRAM REPORT: CPT | Performed by: INTERNAL MEDICINE

## 2019-06-16 PROCEDURE — 2580000003 HC RX 258: Performed by: INTERNAL MEDICINE

## 2019-06-16 RX ORDER — HYDRALAZINE HYDROCHLORIDE 20 MG/ML
5 INJECTION INTRAMUSCULAR; INTRAVENOUS EVERY 6 HOURS PRN
Status: DISCONTINUED | OUTPATIENT
Start: 2019-06-15 | End: 2019-06-17

## 2019-06-16 RX ADMIN — TRAMADOL HYDROCHLORIDE 50 MG: 50 TABLET, FILM COATED ORAL at 22:30

## 2019-06-16 RX ADMIN — SODIUM CHLORIDE, PRESERVATIVE FREE 10 ML: 5 INJECTION INTRAVENOUS at 21:03

## 2019-06-16 RX ADMIN — PROMETHAZINE HYDROCHLORIDE 12.5 MG: 25 INJECTION INTRAMUSCULAR; INTRAVENOUS at 02:33

## 2019-06-16 RX ADMIN — DILTIAZEM HYDROCHLORIDE 180 MG: 180 CAPSULE, COATED, EXTENDED RELEASE ORAL at 08:14

## 2019-06-16 RX ADMIN — TRAMADOL HYDROCHLORIDE 50 MG: 50 TABLET, FILM COATED ORAL at 14:50

## 2019-06-16 RX ADMIN — MAGNESIUM HYDROXIDE 30 ML: 400 SUSPENSION ORAL at 16:32

## 2019-06-16 RX ADMIN — PANTOPRAZOLE SODIUM 40 MG: 40 TABLET, DELAYED RELEASE ORAL at 05:56

## 2019-06-16 RX ADMIN — FENOFIBRATE 134 MG: 134 CAPSULE ORAL at 08:14

## 2019-06-16 RX ADMIN — DOCUSATE SODIUM 100 MG: 100 CAPSULE, LIQUID FILLED ORAL at 08:14

## 2019-06-16 RX ADMIN — CALCIUM POLYCARBOPHIL 625 MG TABLET 625 MG: at 08:14

## 2019-06-16 RX ADMIN — INSULIN LISPRO 2 UNITS: 100 INJECTION, SOLUTION INTRAVENOUS; SUBCUTANEOUS at 11:39

## 2019-06-16 RX ADMIN — ASPIRIN 81 MG: 81 TABLET ORAL at 21:00

## 2019-06-16 RX ADMIN — HYDRALAZINE HYDROCHLORIDE 5 MG: 20 INJECTION INTRAMUSCULAR; INTRAVENOUS at 00:28

## 2019-06-16 RX ADMIN — ATORVASTATIN CALCIUM 20 MG: 20 TABLET, FILM COATED ORAL at 21:00

## 2019-06-16 RX ADMIN — INSULIN LISPRO 1 UNITS: 100 INJECTION, SOLUTION INTRAVENOUS; SUBCUTANEOUS at 21:11

## 2019-06-16 RX ADMIN — ENOXAPARIN SODIUM 100 MG: 100 INJECTION SUBCUTANEOUS at 21:00

## 2019-06-16 RX ADMIN — ZINC SULFATE 220 MG (50 MG) CAPSULE 220 MG: CAPSULE at 08:14

## 2019-06-16 RX ADMIN — INSULIN GLARGINE 12 UNITS: 100 INJECTION, SOLUTION SUBCUTANEOUS at 21:08

## 2019-06-16 RX ADMIN — ACETAMINOPHEN 325 MG: 325 TABLET ORAL at 08:14

## 2019-06-16 RX ADMIN — INSULIN GLARGINE 12 UNITS: 100 INJECTION, SOLUTION SUBCUTANEOUS at 08:16

## 2019-06-16 RX ADMIN — ENOXAPARIN SODIUM 100 MG: 100 INJECTION SUBCUTANEOUS at 08:14

## 2019-06-16 ASSESSMENT — PAIN SCALES - GENERAL
PAINLEVEL_OUTOF10: 5
PAINLEVEL_OUTOF10: 6
PAINLEVEL_OUTOF10: 5

## 2019-06-16 NOTE — CONSULTS
[address] [place] [address], [address] [place] [address]                                  CONSULTATION    PATIENT NAME: Juan Ramon Baldwin                        :        1955  MED REC NO:   [id number]                          ROOM:       [id number]  ACCOUNT NO:   [de-identified]                           ADMIT DATE: 06/15/2019  PROVIDER:     Ammy Ruby MD    CONSULT DATE:  06/15/2019    PRIMARY CARE PROVIDER:  Milton Alexis MD    CHIEF COMPLAINT:  Upper abdominal pain. HISTORY OF PRESENT ILLNESS:  The patient is a 54-year-old white female,  patient known to me very well, with past medical history significant for  hypertension, diabetes mellitus, atrial fibrillation, back injury,  thyroid nodule, and hyperlipidemia, who presented to the emergency room  this morning with palpitation and sweats. The patient was noted to be  in AFib with rapid ventricular rate and she was started on Cardizem and  heart rate was stabilized. The patient is being followed up by the  Cardiology consultant, Dr. Ayaka Perez. The patient also gives history of upper abdominal pain radiating to the  back along with nauseated feeling off and on for the past few months. There is no history of vomiting, hematemesis, melena, hematochezia,  anorexia, or weight loss. The blood workup done today comprised a chem  profile and a CBC, which was within normal limits. The patient's LFTs  are pending. The patient did have a moderate-to-severe episode of acute  pancreatitis in 2016. The patient subsequently saw Dr. Jorden Burks and  had a lap cholecystectomy done. Of note, the patient did not have  gallstones. The patient has not had further episodes of acute  pancreatitis since. The patient did have about four colonoscopies done  in the past and the last colonoscopy was in . The patient had colon  polyps removed.   The patient also has had an EGD done and a small bowel  follow through as well for workup of her abdominal complaints. The  patient is hemodynamically stable at present. REVIEW OF SYSTEMS:  CENTRAL NERVOUS SYSTEM:  The patient denies headache or focal  sensorimotor symptoms. CARDIOVASCULAR SYSTEM:  No history of chest pain, but the patient  complains of \"pounding sensation\" in her chest along with sweats. There  is no history of leg swelling. GENITOURINARY SYSTEM:  No history of dysuria, pyuria, or hematuria. MUSCULOSKELETAL SYSTEM:  No history of aches and pains in muscles and  joints. RESPIRATORY SYSTEM:  No history of cough, hemoptysis, fever, or chills. PAST MEDICAL HISTORY:  Significant for history of hypertension, diabetes  mellitus, atrial fibrillation, back injury, thyroid nodule, and  hyperlipidemia. FAMILY HISTORY:  The patient's father was diagnosed with non-Hodgkin's  lymphoma. MEDICATIONS:  Please refer to the chart. SOCIOECONOMIC HISTORY:  No history of EtOH abuse. The patient does not  smoke cigarettes. PAST SURGICAL HISTORY:  The patient has had hysterectomy done, umbilical  herniorrhaphy, tonsillectomy, cholecystectomy, and laser treatment for  her varicose veins. ALLERGIES:  The patient is allergic to CEPHALEXIN, KEFLEX, SULFA  ANTIBIOTICS, HYDROMORPHONE, PERCOCET, and VICODIN. PHYSICAL EXAMINATION:  GENERAL:  Shows a 54-year-old white female of average build and  nutritional status who is lying comfortably flat in bed, in no acute  distress. She is awake, alert, and oriented and pleasant to talk with. VITAL SIGNS:  Stable. HEENT:  Shows skull to be atraumatic. NECK:  Supple. CHEST:  Clear. HEART:  S1 and S2 are normal.  ABDOMEN:  Soft, obese, and nontender. Liver and spleen are not  palpable. Bowel sounds are present. RECTAL:  Deferred. CNS:  Shows the patient to be awake, alert, and oriented. There are no  focal sensorimotor signs. MUSCULOSKELETAL SYSTEM:  Unremarkable. LABORATORY DATA:  As above mentioned. IMPRESSION:  1.   A 62-year-old white female, presents with palpitations and sweats  and is noted to have AFib with rapid ventricular rate. The patient also  complains of nonspecific upper abdominal pain radiating to the back with  nausea for the past few months, rule out gastritis. 2.  Rule out IBS. RECOMMENDATION:  1. Agree with present management with Protonix. 2.  The patient is instructed to continue to take Bentyl/Levsin  sublingual on a p.r.n. basis. 3.  We will check CBC, chem profile, amylase, and lipase level in the  a.m.  4.  The patient has been instructed to call the office after discharge  for outpatient followup EGD, in fact once her cardiac workup is  completed. 5.  No need for colonoscopy at this point. 6.  The case and plan have been discussed in detail with the patient and  her .         Jasmin Felipe MD    D: 06/15/2019 17:39:24       T: 06/15/2019 19:27:22     [email or web address]  Job#: [id number]     Doc#: [id number]    CC:  Milton Alexis MD

## 2019-06-16 NOTE — PROGRESS NOTES
DOING BETTER HAD AN EPISODE OF SEVERE UPPER ABD PAIN RADIATING THE THE BACK YESTERDAY NO VOMITING OR GROSS  GIT BLEEDING  VITALS STABLE   LABS NOTED   HOME IN AM WILL F/U AS OUTPT

## 2019-06-16 NOTE — PROGRESS NOTES
Pt had a severe episode of pain in her upper abd. Episode lasted about 20-30mins. She described pain as a sharp pain going straight through her to her back. Pt was sweating, pale and unable to get comfortable. This was right after giving pt 1mg of morphine. We went ahead and gave zofran to relieve some of the nausea. Completed EKG. Resulted normal. Called Dr. Nahomy Cruz He ordered Maalox and phenergen, also New labs to obtain in am.   After pt started to feel some relief, some of the pain started to come back. BP started to become elevated 192/92 (120). Completed new EKG. Showed sinus arrhythmia with some pjc's. Called Dr. Jenniffer Rome again and got hydralazine ordered for BP.

## 2019-06-16 NOTE — PROGRESS NOTES
IM Progress Note  6/16/2019 10:24 AM  Subjective:   Admit Date: 6/15/2019  PCP: Henderson Snellen, MD  Chief complaint- PALPITATIONS    Interval History: no recurring afib this am and HR stable on oral cardizem. Had severe bout epigastric pain for about an hr yest which resolved after pain meds. GI started on levsin for IBS        Data:   Scheduled Meds:   aspirin  81 mg Oral Daily    atorvastatin  20 mg Oral Daily    docusate sodium  100 mg Oral Daily    fluticasone  2 spray Nasal Daily    lisinopril-hydrochlorothiazide  1 tablet Oral Daily    niacin  1,000 mg Oral Nightly    omega-3 acid ethyl esters  2 g Oral BID    pantoprazole  40 mg Oral QAM AC    polycarbophil  625 mg Oral Daily    zinc sulfate  220 mg Oral Daily    sodium chloride flush  10 mL Intravenous 2 times per day    enoxaparin  1 mg/kg Subcutaneous BID    insulin lispro  0-6 Units Subcutaneous TID WC    insulin lispro  0-3 Units Subcutaneous Nightly    diltiazem  180 mg Oral Daily    insulin glargine  12 Units Subcutaneous BID    fenofibrate micronized  134 mg Oral Daily with breakfast     Continuous Infusions:   dextrose       PRN Meds:hydrALAZINE, diphenhydrAMINE, sodium chloride flush, magnesium hydroxide, ondansetron, glucose, dextrose, glucagon (rDNA), dextrose, acetaminophen, traMADol, morphine, dicyclomine, hyoscyamine, promethazine  I/O last 3 completed shifts: In: 10 [I.V.:10]  Out: -   CBC:   Recent Labs     06/15/19  0338 06/16/19  0447   WBC 5.8 8.3   HGB 13.9 12.8    202     BMP:    Recent Labs     06/15/19  0338 06/16/19  0447    139   K 3.9 4.0    103   CO2 22 23   BUN 12 15   CREATININE 0.5* 0.6   GLUCOSE 162* 161*     Hepatic:   Recent Labs     06/16/19  0447   AST 27   ALT 35   BILITOT 0.4   ALKPHOS 83     Troponin: No results for input(s): TROPONINI in the last 72 hours. BNP: No results for input(s): BNP in the last 72 hours. INR: No results for input(s): INR in the last 72 hours.     Objective: Vitals: BP (!) 141/71   Pulse 80   Temp 98.5 °F (36.9 °C) (Oral)   Resp 19   Ht 5' 9\" (1.753 m)   Wt 210 lb 6.4 oz (95.4 kg)   SpO2 95%   BMI 31.07 kg/m²   General appearance: alert and cooperative with exam   Lungs: clear to auscultation bilaterally  Heart: regular rate and rhythm, S1, S2 normal, no murmur, click, rub or gallop  Abdomen: soft, non-tender; bowel sounds normal; no masses,  no organomegaly  Extremities: extremities normal, atraumatic, no cyanosis or edema  Neurologic: Mental status: Alert, oriented, thought content appropriate    Assessment and Plan:   1. A fib w RVR, for stress testing in am and also Dr Shellie Shipman back tomorrow. 2. abnl TSH- nondiagnostic for hyperthyroid- as f/u T4 is nl. May need further eval outpt at [facility]  3. DM - insulin dose corrected to home dosing and cont SSI  4. Dr Sakina Farias back in am to resume care.       Patient Active Problem List:     DM type 2 (diabetes mellitus, type 2) ([facility]     Hypercholesterolemia     HTN (hypertension)     Idiopathic acute pancreatitis     Acute pancreatitis     Type 2 diabetes mellitus without complication (HCC)     Essential hypertension     S/P laparoscopic cholecystectomy     Atrial fibrillation with rapid ventricular response (HCC)     Abnormal TSH     Epigastric pain      Izaiah Garcia MD

## 2019-06-17 ENCOUNTER — APPOINTMENT (OUTPATIENT)
Dept: NUCLEAR MEDICINE | Age: 64
DRG: 192 | End: 2019-06-17
Payer: COMMERCIAL

## 2019-06-17 LAB
GLUCOSE BLD-MCNC: 126 MG/DL (ref 70–99)
GLUCOSE BLD-MCNC: 130 MG/DL (ref 70–99)
GLUCOSE BLD-MCNC: 134 MG/DL (ref 70–99)
GLUCOSE BLD-MCNC: 192 MG/DL (ref 70–99)
GLUCOSE BLD-MCNC: 256 MG/DL (ref 70–99)
LV EF: 50 %
LV EF: 73 %
LVEF MODALITY: NORMAL
LVEF MODALITY: NORMAL

## 2019-06-17 PROCEDURE — C1769 GUIDE WIRE: HCPCS

## 2019-06-17 PROCEDURE — 86800 THYROGLOBULIN ANTIBODY: CPT

## 2019-06-17 PROCEDURE — 6360000004 HC RX CONTRAST MEDICATION

## 2019-06-17 PROCEDURE — 2580000003 HC RX 258: Performed by: INTERNAL MEDICINE

## 2019-06-17 PROCEDURE — 6360000002 HC RX W HCPCS: Performed by: INTERNAL MEDICINE

## 2019-06-17 PROCEDURE — 6360000002 HC RX W HCPCS

## 2019-06-17 PROCEDURE — 36415 COLL VENOUS BLD VENIPUNCTURE: CPT

## 2019-06-17 PROCEDURE — 3430000000 HC RX DIAGNOSTIC RADIOPHARMACEUTICAL: Performed by: INTERNAL MEDICINE

## 2019-06-17 PROCEDURE — 78452 HT MUSCLE IMAGE SPECT MULT: CPT

## 2019-06-17 PROCEDURE — 93306 TTE W/DOPPLER COMPLETE: CPT

## 2019-06-17 PROCEDURE — 82962 GLUCOSE BLOOD TEST: CPT

## 2019-06-17 PROCEDURE — A9500 TC99M SESTAMIBI: HCPCS | Performed by: INTERNAL MEDICINE

## 2019-06-17 PROCEDURE — 84445 ASSAY OF TSI GLOBULIN: CPT

## 2019-06-17 PROCEDURE — 6370000000 HC RX 637 (ALT 250 FOR IP): Performed by: INTERNAL MEDICINE

## 2019-06-17 PROCEDURE — 2709999900 HC NON-CHARGEABLE SUPPLY

## 2019-06-17 PROCEDURE — 93458 L HRT ARTERY/VENTRICLE ANGIO: CPT

## 2019-06-17 PROCEDURE — 4A023N7 MEASUREMENT OF CARDIAC SAMPLING AND PRESSURE, LEFT HEART, PERCUTANEOUS APPROACH: ICD-10-PCS | Performed by: INTERNAL MEDICINE

## 2019-06-17 PROCEDURE — 86376 MICROSOMAL ANTIBODY EACH: CPT

## 2019-06-17 PROCEDURE — 93017 CV STRESS TEST TRACING ONLY: CPT

## 2019-06-17 PROCEDURE — C1887 CATHETER, GUIDING: HCPCS

## 2019-06-17 PROCEDURE — B2151ZZ FLUOROSCOPY OF LEFT HEART USING LOW OSMOLAR CONTRAST: ICD-10-PCS | Performed by: INTERNAL MEDICINE

## 2019-06-17 PROCEDURE — C1894 INTRO/SHEATH, NON-LASER: HCPCS

## 2019-06-17 PROCEDURE — 2140000000 HC CCU INTERMEDIATE R&B

## 2019-06-17 PROCEDURE — B2111ZZ FLUOROSCOPY OF MULTIPLE CORONARY ARTERIES USING LOW OSMOLAR CONTRAST: ICD-10-PCS | Performed by: INTERNAL MEDICINE

## 2019-06-17 RX ORDER — MORPHINE SULFATE 4 MG/ML
1 INJECTION, SOLUTION INTRAMUSCULAR; INTRAVENOUS
Status: ACTIVE | OUTPATIENT
Start: 2019-06-17 | End: 2019-06-17

## 2019-06-17 RX ORDER — SODIUM CHLORIDE 9 MG/ML
INJECTION, SOLUTION INTRAVENOUS CONTINUOUS
Status: DISCONTINUED | OUTPATIENT
Start: 2019-06-17 | End: 2019-06-17

## 2019-06-17 RX ORDER — SODIUM CHLORIDE 9 MG/ML
INJECTION, SOLUTION INTRAVENOUS
Status: DISPENSED
Start: 2019-06-17 | End: 2019-06-17

## 2019-06-17 RX ORDER — SODIUM CHLORIDE 0.9 % (FLUSH) 0.9 %
10 SYRINGE (ML) INJECTION PRN
Status: DISCONTINUED | OUTPATIENT
Start: 2019-06-17 | End: 2019-06-19 | Stop reason: HOSPADM

## 2019-06-17 RX ORDER — SOTALOL HYDROCHLORIDE 80 MG/1
40 TABLET ORAL 2 TIMES DAILY
Status: DISCONTINUED | OUTPATIENT
Start: 2019-06-17 | End: 2019-06-19 | Stop reason: HOSPADM

## 2019-06-17 RX ORDER — ACETAMINOPHEN 325 MG/1
650 TABLET ORAL EVERY 4 HOURS PRN
Status: DISCONTINUED | OUTPATIENT
Start: 2019-06-17 | End: 2019-06-19 | Stop reason: HOSPADM

## 2019-06-17 RX ORDER — LISINOPRIL 5 MG/1
5 TABLET ORAL DAILY
Status: DISCONTINUED | OUTPATIENT
Start: 2019-06-17 | End: 2019-06-18

## 2019-06-17 RX ORDER — SODIUM CHLORIDE 9 MG/ML
INJECTION, SOLUTION INTRAVENOUS CONTINUOUS
Status: DISCONTINUED | OUTPATIENT
Start: 2019-06-17 | End: 2019-06-18

## 2019-06-17 RX ORDER — SODIUM CHLORIDE 0.9 % (FLUSH) 0.9 %
10 SYRINGE (ML) INJECTION EVERY 12 HOURS SCHEDULED
Status: DISCONTINUED | OUTPATIENT
Start: 2019-06-17 | End: 2019-06-19 | Stop reason: HOSPADM

## 2019-06-17 RX ORDER — DILTIAZEM HYDROCHLORIDE 120 MG/1
120 CAPSULE, COATED, EXTENDED RELEASE ORAL DAILY
Status: DISCONTINUED | OUTPATIENT
Start: 2019-06-18 | End: 2019-06-18

## 2019-06-17 RX ORDER — ATROPINE SULFATE 0.4 MG/ML
0.5 AMPUL (ML) INJECTION
Status: ACTIVE | OUTPATIENT
Start: 2019-06-17 | End: 2019-06-17

## 2019-06-17 RX ADMIN — SODIUM CHLORIDE: 9 INJECTION, SOLUTION INTRAVENOUS at 15:15

## 2019-06-17 RX ADMIN — SODIUM CHLORIDE, PRESERVATIVE FREE 10 ML: 5 INJECTION INTRAVENOUS at 21:10

## 2019-06-17 RX ADMIN — ENOXAPARIN SODIUM 100 MG: 100 INJECTION SUBCUTANEOUS at 11:25

## 2019-06-17 RX ADMIN — INSULIN LISPRO 1 UNITS: 100 INJECTION, SOLUTION INTRAVENOUS; SUBCUTANEOUS at 17:37

## 2019-06-17 RX ADMIN — DILTIAZEM HYDROCHLORIDE 180 MG: 180 CAPSULE, COATED, EXTENDED RELEASE ORAL at 11:27

## 2019-06-17 RX ADMIN — REGADENOSON 0.4 MG: 0.08 INJECTION, SOLUTION INTRAVENOUS at 09:31

## 2019-06-17 RX ADMIN — ZINC SULFATE 220 MG (50 MG) CAPSULE 220 MG: CAPSULE at 11:30

## 2019-06-17 RX ADMIN — Medication 10 MILLICURIE: at 08:05

## 2019-06-17 RX ADMIN — SODIUM CHLORIDE: 9 INJECTION, SOLUTION INTRAVENOUS at 11:44

## 2019-06-17 RX ADMIN — LISINOPRIL 5 MG: 5 TABLET ORAL at 15:14

## 2019-06-17 RX ADMIN — CALCIUM POLYCARBOPHIL 625 MG TABLET 625 MG: at 11:29

## 2019-06-17 RX ADMIN — Medication 30 MILLICURIE: at 09:35

## 2019-06-17 RX ADMIN — FENOFIBRATE 134 MG: 134 CAPSULE ORAL at 11:31

## 2019-06-17 RX ADMIN — SOTALOL HYDROCHLORIDE 40 MG: 80 TABLET ORAL at 21:11

## 2019-06-17 RX ADMIN — SOTALOL HYDROCHLORIDE 40 MG: 80 TABLET ORAL at 15:14

## 2019-06-17 RX ADMIN — INSULIN GLARGINE 12 UNITS: 100 INJECTION, SOLUTION SUBCUTANEOUS at 21:10

## 2019-06-17 RX ADMIN — PANTOPRAZOLE SODIUM 40 MG: 40 TABLET, DELAYED RELEASE ORAL at 06:40

## 2019-06-17 RX ADMIN — MAGNESIUM HYDROXIDE 30 ML: 400 SUSPENSION ORAL at 21:54

## 2019-06-17 RX ADMIN — DICYCLOMINE HYDROCHLORIDE 20 MG: 10 CAPSULE ORAL at 21:11

## 2019-06-17 RX ADMIN — TRAMADOL HYDROCHLORIDE 50 MG: 50 TABLET, FILM COATED ORAL at 21:10

## 2019-06-17 RX ADMIN — ATORVASTATIN CALCIUM 20 MG: 20 TABLET, FILM COATED ORAL at 21:11

## 2019-06-17 RX ADMIN — DOCUSATE SODIUM 100 MG: 100 CAPSULE, LIQUID FILLED ORAL at 11:30

## 2019-06-17 RX ADMIN — ASPIRIN 81 MG: 81 TABLET ORAL at 21:11

## 2019-06-17 RX ADMIN — SODIUM CHLORIDE, PRESERVATIVE FREE 10 ML: 5 INJECTION INTRAVENOUS at 11:25

## 2019-06-17 ASSESSMENT — PAIN SCALES - GENERAL: PAINLEVEL_OUTOF10: 5

## 2019-06-17 NOTE — PLAN OF CARE
Problem: Pain:  Goal: Pain level will decrease  Outcome: Ongoing  Goal: Control of acute pain  Outcome: Ongoing  Goal: Control of chronic pain  Outcome: Ongoing

## 2019-06-17 NOTE — PROGRESS NOTES
Daily Progress Note    I have seen ,spoken to  and examined this patient personally, independently of the Physician assistant . I have reviewed the hospital care given to date and reviewed all pertinent labs and imaging. The plan was developed mutually at the time of the visit with the patient,  PA  and myself. I have spoken with patient, nursing staff and provided written and verbal instructions . The above note has been reviewed and I agree with the assessment, diagnosis, and treatment plan with changes made by me as follows     CARDIOLOGY ATTENDING ADDENDUM    HPI:  I have reviewed the above HPI  And agree with above   Dipti Jacobson is a 58 y. o.year old who and presents with had concerns including Atrial Fibrillation. Chief Complaint   Patient presents with    Atrial Fibrillation     Interval history:  Patient is awake alert  Feeling ok  Still having chest pain  Stress test showed moderate inferolateral ischemia normal EF  Plan for cath today   Hx of diabetes and HTN  Now with PAFIB       Physical Exam:  General:  Awake alert   Head:normal  Eye:normal  Neck:  No JVD   Chest:  Clear to auscultation, respiration easy  Cardiovascular:  Sinus   Abdomen:   nontender  Extremities:  No edema    Pulses; palpable  Neuro: grossly normal      MEDICAL DECISION MAKING;    I agree with the above plan, which was planned by myself and discussed with PA. Michelle Valles Electronically signed by Roxann Cifuentes MD [facility] on 6/17/2019 at 12:22 PM      Pt. Awake, alert and feeling ok  HR stable, NSR, BP is elevated   Denies CP, SOB    Afib with RVR    Placed on cardizem drip on admission    NSR now    On PO Cardizem-did not tolerate BB    On AC    Stress and echo today-will review    Consider BARBIE as it occurred while sleeping    Will cont. To follow  Further recs after testing.     Past medical history:    has a past medical history of Arrhythmia, Arrhythmia, Atrial fibrillation ([address] Back injury, Diabetes mellitus ([address] Hypercholesteremia, Hyperlipidemia, Hypertension, Lumbar herniated disc, Nausea & vomiting, and Thyroid nodule. Past surgical history:   has a past surgical history that includes Hysterectomy; Mouth surgery; Colonoscopy; Dilation and curettage of uterus; Umbilical hernia repair (8/21/12); and Tonsillectomy. Social History:   reports that she has never smoked. She does not have any smokeless tobacco history on file. She reports that she does not drink alcohol or use drugs.   Family history:   no family history of CAD, STROKE of DM at early age        Objective:   BP (!) 150/75   Pulse 66   Temp 97.9 °F (36.6 °C) (Oral)   Resp 20   Ht 5' 9\" (1.753 m)   Wt 212 lb 9.6 oz (96.4 kg)   SpO2 96%   BMI 31.40 kg/m²       Intake/Output Summary (Last 24 hours) at 6/17/2019 1138  Last data filed at 6/16/2019 2103  Gross per 24 hour   Intake 10 ml   Output --   Net 10 ml       Medications:   Scheduled Meds:   aspirin  81 mg Oral Daily    atorvastatin  20 mg Oral Daily    docusate sodium  100 mg Oral Daily    fluticasone  2 spray Nasal Daily    lisinopril-hydrochlorothiazide  1 tablet Oral Daily    niacin  1,000 mg Oral Nightly    omega-3 acid ethyl esters  2 g Oral BID    pantoprazole  40 mg Oral QAM AC    polycarbophil  625 mg Oral Daily    zinc sulfate  220 mg Oral Daily    sodium chloride flush  10 mL Intravenous 2 times per day    enoxaparin  1 mg/kg Subcutaneous BID    insulin lispro  0-6 Units Subcutaneous TID WC    insulin lispro  0-3 Units Subcutaneous Nightly    diltiazem  180 mg Oral Daily    insulin glargine  12 Units Subcutaneous BID    fenofibrate micronized  134 mg Oral Daily with breakfast      Infusions:   sodium chloride      dextrose        PRN Meds:  hydrALAZINE, diphenhydrAMINE, sodium chloride flush, magnesium hydroxide, ondansetron, glucose, dextrose, glucagon (rDNA), dextrose, acetaminophen, traMADol, morphine, dicyclomine, hyoscyamine, promethazine       Physical Exam:  Vitals:    06/17/19 0815 BP: (!) 150/75   Pulse: 66   Resp: 20   Temp: 97.9 °F (36.6 °C)   SpO2:         General: AAO, NAD  Chest: Nontender  Cardiac: First and Second Heart Sounds are Normal, No Murmurs or Gallops noted  Lungs:Clear to auscultation and percussion. Abdomen: Soft, NT, ND, +BS  Extremities: No clubbing, no edema  Vascular:  Equal 2+ peripheral pulses. Lab Data:  CBC:   Recent Labs     06/15/19  0338 06/16/19 0447   WBC 5.8 8.3   HGB 13.9 12.8   HCT 44.1 41.2   MCV 84.3 85.5    202     BMP:   Recent Labs     06/15/19  0338 06/16/19  0447    139   K 3.9 4.0    103   CO2 22 23   BUN 12 15   CREATININE 0.5* 0.6     LIVER PROFILE:   Recent Labs     06/15/19  0338 06/16/19  0447   AST  --  27   ALT  --  35   LIPASE 34 21   BILITOT  --  0.4   ALKPHOS  --  83     PT/INR: No results for input(s): PROTIME, INR in the last 72 hours. APTT: No results for input(s): APTT in the last 72 hours. BNP:  No results for input(s): BNP in the last 72 hours.       Assessment:  Patient Active Problem List    Diagnosis Date Noted    Atrial fibrillation with rapid ventricular response ([facility] 06/15/2019     Priority: High    Abnormal TSH 06/15/2019     Priority: Medium    Epigastric pain 06/15/2019    S/P laparoscopic cholecystectomy 11/02/2016    Type 2 diabetes mellitus without complication ([facility]     Essential hypertension     Acute pancreatitis     Idiopathic acute pancreatitis 01/18/2016    DM type 2 (diabetes mellitus, type 2) ([facility] [address] 08/21/2012    Hypercholesterolemia 08/21/2012    HTN (hypertension) 08/21/2012       Electronically signed by Guido Collazo PA-C on 6/17/2019 at 11:39 AM

## 2019-06-17 NOTE — PROGRESS NOTES
INTERNAL MEDICINE PROGRESS NOTE        Tino Patches   1955   Primary Care Physician:  Fanta Matos MD  Admit Date: 6/15/2019     Subjective:   Pt is doing better today. Denies chest pain, SOB, nausea, vomiting, abdominal pain. Remainder of ROS is unremarkable. Meds, labs and other notes reviewed. Remains in NSR. BS noted. Objective:   /67   Pulse 62   Temp 98 °F (36.7 °C) (Oral)   Resp 22   Ht 5' 9\" (1.753 m)   Wt 212 lb 9.6 oz (96.4 kg)   SpO2 96%   BMI 31.40 kg/m²    Recent Labs     06/16/19  0725 06/16/19  1114 06/16/19  1647 06/16/19  2108   POCGLU 120* 217* 142* 176*       I/O last 3 completed shifts: In: 10 [I.V.:10]  Out: -   No intake/output data recorded. Neck: no adenopathy and supple, symmetrical, trachea midline  Lungs: clear to auscultation bilaterally  Heart: regular rate and rhythm and S1, S2 normal  Abdomen: soft, non-tender; bowel sounds normal; no masses,  no organomegaly  Extremities: extremities normal, atraumatic, no cyanosis or edema  Neurologic: Grossly normal    Data Review  CBC with Differential:    Recent Labs     06/15/19  0338 06/16/19  0447   WBC 5.8 8.3   RBC 5.23 4.82   HGB 13.9 12.8   HCT 44.1 41.2    202   MCV 84.3 85.5   MCH 26.6* 26.6*   MCHC 31.5* 31.1*   RDW 12.6 12.9   SEGSPCT 45.7 70.8*   LYMPHOPCT 34.6 18.8*   MONOPCT 15.1* 9.6*   BASOPCT 0.5 0.2   MONOSABS 0.9 0.8   LYMPHSABS 2.0 1.6   EOSABS 0.2 0.0   BASOSABS 0.0 0.0   DIFFTYPE AUTOMATED DIFFERENTIAL AUTOMATED DIFFERENTIAL     CMP:    Recent Labs     06/15/19  0338 06/16/19 0447    139   K 3.9 4.0    103   CO2 22 23   BUN 12 15   CREATININE 0.5* 0.6   GFRAA >60 >60   LABGLOM >60 >60   GLUCOSE 162* 161*   PROT  --  6.8   LABALBU  --  4.2   CALCIUM 9.9 9.2   BILITOT  --  0.4   ALKPHOS  --  83   AST  --  27   ALT  --  35     PT/INR:  No results for input(s): PROTIME, INR in the last 72 hours.   Meds:    aspirin  81 mg Oral Daily    atorvastatin  20 mg Oral Daily    docusate

## 2019-06-17 NOTE — OP NOTE
DICTATED -[id number]  LEFT MAIN PATENT  LAD/RAMUS/LCX AND RCA MILD DX  LVEDP 15  MEDICAL TREATMENT  ADD SOTALOL FOR AFIB

## 2019-06-17 NOTE — PROCEDURES
[address] [place] [address], [address] [place] [address]                            CARDIAC CATHETERIZATION    PATIENT NAME: Ashtyn Kaminksi                        :        1955  MED REC NO:   [id number]                          ROOM:       2666  ACCOUNT NO:   [de-identified]                           ADMIT DATE: 06/15/2019  PROVIDER:     Buddy Nayak MD    DATE OF PROCEDURE:  2019    INDICATIONS:  This is a 27-year-old female patient, brought to cath lab  today. Informed consent was obtained from the patient. The patient had  a stress test done and found to have abnormal stress test.  The patient  had a new onset of AFib also and symptoms are noted. Therefore, a  cardiac catheterization was performed. DESCRIPTION OF PROCEDURE:  The patient was brought to cath lab today. Informed consent was obtained from the patient. The patient was prepped  and draped in usual sterile fashion. The patient was injected with 5 mL  of 2% lidocaine in the right radial region. Using a radial needle, the  right radial artery was canalized and a 5/6-Gu[place]ian sheath was placed in  the right radial artery. The entire procedure was done using a  guidewire. The sheath was flushed in between the procedures. Using a TIG catheter, right coronary angiogram was performed. Right  coronary angiogram revealed the right coronary artery is a large-sized  vessel, dominant vessel. It gives off PD and PL branch. Right coronary  artery had mild disease noted with a GERARDO-3 flow was present. Using an AL1 catheter, left coronary angiogram was performed. Left  coronary angiogram revealed the left main is patent, it trifurcates into  LAD and left circumflex artery with ramus branch. Circ is a  medium-sized vessel. It gives off a small OM branch. OM1 and OM2,  there is mild disease noted. A medium-sized ramus branch was present. There is mild disease noted.   LAD is a medium-sized vessel. It reaches  and wraps the apex. Proximal, mid, and distal LAD has mild disease  noted and gives off a small diagonal branch. There is a GERARDO-3 flow is  present. Using a pigtail catheter, EDP was measured. EDP was around 15 mmHg  present. On the pullback, there was no gradient present across the  aortic valve. IMPRESSION:  1. Left main is patent. 2.  LAD, circ, ramus, and RCA all have mild coronary artery disease  present. No obstruction lesion noted. EDP is normal.  The plan at this  time is medical treatment. The patient tolerated the procedure well. No complications noted.         Rolo Huitron MD    D: 06/17/2019 14:13:20       T: 06/17/2019 15:18:36     [email or web address]  Job#: [id number]     Doc#: [id number]    CC:

## 2019-06-17 NOTE — CARE COORDINATION
.CM met with pt for d/c planning. Introduced self and updated white board. Pt lives with spouse and is independent with ADL's. Pt has transportation, has a PCP, has insurance, and is able to afford  medication. Pt reports to have a shower seat. [facility] offered and pt refused. Pt denies any needs at this time. D/c plan is home with spouse, no needs. CM will continue to follow.   TE

## 2019-06-18 ENCOUNTER — APPOINTMENT (OUTPATIENT)
Dept: ULTRASOUND IMAGING | Age: 64
DRG: 192 | End: 2019-06-18
Payer: COMMERCIAL

## 2019-06-18 LAB
ANTITHYROID MICORSOMAL: 129.8 IU/ML (ref 0–9)
ANTITHYROID MICORSOMAL: ABNORMAL IU/ML (ref 0–9)
EKG ATRIAL RATE: 49 BPM
EKG DIAGNOSIS: NORMAL
EKG P AXIS: 28 DEGREES
EKG P-R INTERVAL: 160 MS
EKG Q-T INTERVAL: 462 MS
EKG QRS DURATION: 84 MS
EKG QTC CALCULATION (BAZETT): 417 MS
EKG R AXIS: 14 DEGREES
EKG T AXIS: 19 DEGREES
EKG VENTRICULAR RATE: 49 BPM
GLUCOSE BLD-MCNC: 130 MG/DL (ref 70–99)
GLUCOSE BLD-MCNC: 138 MG/DL (ref 70–99)
GLUCOSE BLD-MCNC: 143 MG/DL (ref 70–99)
GLUCOSE BLD-MCNC: 147 MG/DL (ref 70–99)

## 2019-06-18 PROCEDURE — 93010 ELECTROCARDIOGRAM REPORT: CPT | Performed by: INTERNAL MEDICINE

## 2019-06-18 PROCEDURE — 36415 COLL VENOUS BLD VENIPUNCTURE: CPT

## 2019-06-18 PROCEDURE — 6370000000 HC RX 637 (ALT 250 FOR IP): Performed by: INTERNAL MEDICINE

## 2019-06-18 PROCEDURE — 84481 FREE ASSAY (FT-3): CPT

## 2019-06-18 PROCEDURE — 86376 MICROSOMAL ANTIBODY EACH: CPT

## 2019-06-18 PROCEDURE — 2580000003 HC RX 258: Performed by: INTERNAL MEDICINE

## 2019-06-18 PROCEDURE — 86800 THYROGLOBULIN ANTIBODY: CPT

## 2019-06-18 PROCEDURE — 93005 ELECTROCARDIOGRAM TRACING: CPT | Performed by: INTERNAL MEDICINE

## 2019-06-18 PROCEDURE — 82962 GLUCOSE BLOOD TEST: CPT

## 2019-06-18 PROCEDURE — 2140000000 HC CCU INTERMEDIATE R&B

## 2019-06-18 PROCEDURE — 76536 US EXAM OF HEAD AND NECK: CPT

## 2019-06-18 RX ORDER — CYCLOBENZAPRINE HCL 10 MG
10 TABLET ORAL 3 TIMES DAILY PRN
Status: DISCONTINUED | OUTPATIENT
Start: 2019-06-18 | End: 2019-06-19 | Stop reason: HOSPADM

## 2019-06-18 RX ADMIN — SOTALOL HYDROCHLORIDE 40 MG: 80 TABLET ORAL at 10:26

## 2019-06-18 RX ADMIN — CALCIUM POLYCARBOPHIL 625 MG TABLET 625 MG: at 09:31

## 2019-06-18 RX ADMIN — SOTALOL HYDROCHLORIDE 40 MG: 80 TABLET ORAL at 21:26

## 2019-06-18 RX ADMIN — SODIUM CHLORIDE, PRESERVATIVE FREE 10 ML: 5 INJECTION INTRAVENOUS at 21:27

## 2019-06-18 RX ADMIN — ACETAMINOPHEN 650 MG: 325 TABLET ORAL at 09:39

## 2019-06-18 RX ADMIN — PANTOPRAZOLE SODIUM 40 MG: 40 TABLET, DELAYED RELEASE ORAL at 05:28

## 2019-06-18 RX ADMIN — SODIUM CHLORIDE, PRESERVATIVE FREE 10 ML: 5 INJECTION INTRAVENOUS at 09:34

## 2019-06-18 RX ADMIN — INSULIN GLARGINE 12 UNITS: 100 INJECTION, SOLUTION SUBCUTANEOUS at 09:40

## 2019-06-18 RX ADMIN — CYCLOBENZAPRINE HYDROCHLORIDE 10 MG: 10 TABLET, FILM COATED ORAL at 21:27

## 2019-06-18 RX ADMIN — ASPIRIN 81 MG: 81 TABLET ORAL at 21:27

## 2019-06-18 RX ADMIN — ZINC SULFATE 220 MG (50 MG) CAPSULE 220 MG: CAPSULE at 09:31

## 2019-06-18 RX ADMIN — INSULIN GLARGINE 12 UNITS: 100 INJECTION, SOLUTION SUBCUTANEOUS at 21:26

## 2019-06-18 RX ADMIN — SODIUM CHLORIDE: 9 INJECTION, SOLUTION INTRAVENOUS at 05:33

## 2019-06-18 RX ADMIN — ATORVASTATIN CALCIUM 20 MG: 20 TABLET, FILM COATED ORAL at 21:26

## 2019-06-18 RX ADMIN — FENOFIBRATE 134 MG: 134 CAPSULE ORAL at 09:30

## 2019-06-18 ASSESSMENT — PAIN SCALES - GENERAL
PAINLEVEL_OUTOF10: 0
PAINLEVEL_OUTOF10: 2
PAINLEVEL_OUTOF10: 0

## 2019-06-18 NOTE — PLAN OF CARE
Problem: Pain:  Goal: Pain level will decrease  Description  Pain level will decrease  6/17/2019 2217 by Giovani Collins RN  Outcome: Ongoing  6/17/2019 1545 by Adam Nunn RN  Outcome: Ongoing  Goal: Control of acute pain  Description  Control of acute pain  6/17/2019 2217 by Giovani Collins RN  Outcome: Ongoing  6/17/2019 1545 by Adam Nunn RN  Outcome: Ongoing  Goal: Control of chronic pain  Description  Control of chronic pain  6/17/2019 2217 by Giovani Collins RN  Outcome: Ongoing  6/17/2019 1545 by Adam Nunn RN  Outcome: Ongoing     Problem:  Bowel/Gastric:  Goal: Control of bowel function will improve  Description  Control of bowel function will improve  Outcome: Ongoing  Goal: Ability to achieve a regular elimination pattern will improve  Description  Ability to achieve a regular elimination pattern will improve  Outcome: Ongoing     Problem: Nutritional:  Goal: Ability to follow a diet with enough fiber (20 to 30 grams) for normal bowel function will improve  Description  Ability to follow a diet with enough fiber (20 to 30 grams) for normal bowel function will improve  Outcome: Ongoing     Problem: Skin Integrity:  Goal: Risk for impaired skin integrity will decrease  Description  Risk for impaired skin integrity will decrease  Outcome: Ongoing     Problem: Cardiac Output - Decreased:  Goal: Hemodynamic stability will improve  Description  Hemodynamic stability will improve  Outcome: Ongoing     Problem: Infection:  Goal: Will remain free from infection  Description  Will remain free from infection  Outcome: Ongoing     Problem: Safety:  Goal: Free from accidental physical injury  Description  Free from accidental physical injury  Outcome: Ongoing  Goal: Free from intentional harm  Description  Free from intentional harm  Outcome: Ongoing     Problem: Daily Care:  Goal: Daily care needs are met  Description  Daily care needs are met  Outcome: Ongoing     Problem: Discharge Planning:  Goal: Patients continuum of care needs are met  Description  Patients continuum of care needs are met  Outcome: Ongoing

## 2019-06-18 NOTE — PROGRESS NOTES
Daily Progress Note    patient is awake alert   No chest pain-remain in sinus rate is in 50's not symptomatic  Thyroiditis noted on US elevated thyroid antibody  Keep on BETAPACE for now  PAFIB keep on Betapace --hold of AC--keep on ASA -for now-  Home tomorrow from cardiac stand   Will check EKG in am    Cath--DICTATED -[id number]  LEFT MAIN PATENT  LAD/RAMUS/LCX AND RCA MILD DX  LVEDP 15  MEDICAL TREATMENT  ADD SOTALOL FOR AFIB     Echo--Summary   Left ventricular function is normal, EF is estimated at 50%.    Mild left ventricular hypertrophy.   Grade II diastolic dysfunction.   No significant valvular disease noted.   No evidence of pericardial effusion.       Objective:   /70   Pulse 59   Temp 97.7 °F (36.5 °C) (Oral)   Resp 15   Ht 5' 9\" (1.753 m)   Wt 212 lb 9.6 oz (96.4 kg)   SpO2 97%   BMI 31.40 kg/m²       Intake/Output Summary (Last 24 hours) at 6/18/2019 1211  Last data filed at 6/18/2019 0534  Gross per 24 hour   Intake 1050 ml   Output --   Net 1050 ml       Medications:   Scheduled Meds:   sodium chloride flush  10 mL Intravenous 2 times per day    sotalol  40 mg Oral BID    aspirin  81 mg Oral Daily    atorvastatin  20 mg Oral Daily    docusate sodium  100 mg Oral Daily    fluticasone  2 spray Nasal Daily    pantoprazole  40 mg Oral QAM AC    polycarbophil  625 mg Oral Daily    zinc sulfate  220 mg Oral Daily    sodium chloride flush  10 mL Intravenous 2 times per day    insulin lispro  0-6 Units Subcutaneous TID WC    insulin lispro  0-3 Units Subcutaneous Nightly    insulin glargine  12 Units Subcutaneous BID    fenofibrate micronized  134 mg Oral Daily with breakfast      Infusions:   dextrose        PRN Meds:  sodium chloride flush, acetaminophen, diphenhydrAMINE, sodium chloride flush, magnesium hydroxide, ondansetron, glucose, dextrose, glucagon (rDNA), dextrose, traMADol, morphine, dicyclomine, hyoscyamine, promethazine       Physical Exam:  Vitals:    06/18/19 0717 BP: 130/70   Pulse: 59   Resp: 15   Temp: 97.7 °F (36.5 °C)   SpO2:         General: awake alert   Chest: Nontender  Cardiac: sinus   Lungs:Clear to auscultation and percussion. Abdomen: Soft, NT, ND, +BS  Extremities: no edema   Vascular:  Equal 2+ peripheral pulses. Lab Data:  CBC:   Recent Labs     06/16/19 0447   WBC 8.3   HGB 12.8   HCT 41.2   MCV 85.5        BMP:   Recent Labs     06/16/19 0447      K 4.0      CO2 23   BUN 15   CREATININE 0.6     LIVER PROFILE:   Recent Labs     06/16/19 0447   AST 27   ALT 35   LIPASE 21   BILITOT 0.4   ALKPHOS 83     PT/INR: No results for input(s): PROTIME, INR in the last 72 hours. APTT: No results for input(s): APTT in the last 72 hours. BNP:  No results for input(s): BNP in the last 72 hours.       Assessment:  Patient Active Problem List    Diagnosis Date Noted    Atrial fibrillation with rapid ventricular response ([facility] 06/15/2019     Priority: High    Abnormal TSH 06/15/2019     Priority: Medium    Epigastric pain 06/15/2019    S/P laparoscopic cholecystectomy 11/02/2016    Type 2 diabetes mellitus without complication ([address]     Essential hypertension     Acute pancreatitis     Idiopathic acute pancreatitis 01/18/2016    DM type 2 (diabetes mellitus, type 2) ([facility] 08/21/2012    Hypercholesterolemia 08/21/2012    HTN (hypertension) 08/21/2012       Shelley Carrizales MD 6/18/2019 12:11 PM

## 2019-06-18 NOTE — PROGRESS NOTES
INTERNAL MEDICINE PROGRESS NOTE        Inez Santo   1955   Primary Care Physician:  Clifton Polanco MD  Admit Date: 6/15/2019     Subjective:   Pt is doing better today. Denies chest pain, SOB, nausea, vomiting, abdominal pain. Remainder of ROS is unremarkable. Meds, labs and other notes reviewed. Remains in NSR. BS noted. Cardiac cath normal.  Started on Betapace for A fib. Objective:   /60   Pulse 54   Temp 98.1 °F (36.7 °C) (Oral)   Resp 23   Ht 5' 9\" (1.753 m)   Wt 212 lb 9.6 oz (96.4 kg)   SpO2 97%   BMI 31.40 kg/m²    Recent Labs     06/17/19  1451 06/17/19  1726 06/17/19  2040 06/18/19  0727   POCGLU 130* 192* 134* 138*       I/O last 3 completed shifts: In: 1050 [P.O.:240; I.V.:810]  Out: -   No intake/output data recorded. Neck: no adenopathy and supple, symmetrical, trachea midline  Lungs: clear to auscultation bilaterally  Heart: regular rate and rhythm and S1, S2 normal  Abdomen: soft, non-tender; bowel sounds normal; no masses,  no organomegaly  Extremities: extremities normal, atraumatic, no cyanosis or edema  Neurologic: Grossly normal    Data Review  CBC with Differential:    Recent Labs     06/16/19  0447   WBC 8.3   RBC 4.82   HGB 12.8   HCT 41.2      MCV 85.5   MCH 26.6*   MCHC 31.1*   RDW 12.9   SEGSPCT 70.8*   LYMPHOPCT 18.8*   MONOPCT 9.6*   BASOPCT 0.2   MONOSABS 0.8   LYMPHSABS 1.6   EOSABS 0.0   BASOSABS 0.0   DIFFTYPE AUTOMATED DIFFERENTIAL     CMP:    Recent Labs     06/16/19  0447      K 4.0      CO2 23   BUN 15   CREATININE 0.6   GFRAA >60   LABGLOM >60   GLUCOSE 161*   PROT 6.8   LABALBU 4.2   CALCIUM 9.2   BILITOT 0.4   ALKPHOS 83   AST 27   ALT 35     PT/INR:  No results for input(s): PROTIME, INR in the last 72 hours.   Meds:    sodium chloride flush  10 mL Intravenous 2 times per day    sotalol  40 mg Oral BID    lisinopril  5 mg Oral Daily    aspirin  81 mg Oral Daily    atorvastatin  20 mg Oral Daily    docusate sodium  100 mg Oral Daily    fluticasone  2 spray Nasal Daily    niacin  1,000 mg Oral Nightly    omega-3 acid ethyl esters  2 g Oral BID    pantoprazole  40 mg Oral QAM AC    polycarbophil  625 mg Oral Daily    zinc sulfate  220 mg Oral Daily    sodium chloride flush  10 mL Intravenous 2 times per day    insulin lispro  0-6 Units Subcutaneous TID WC    insulin lispro  0-3 Units Subcutaneous Nightly    insulin glargine  12 Units Subcutaneous BID    fenofibrate micronized  134 mg Oral Daily with breakfast     PRN Meds: sodium chloride flush, acetaminophen, diphenhydrAMINE, sodium chloride flush, magnesium hydroxide, ondansetron, glucose, dextrose, glucagon (rDNA), dextrose, traMADol, morphine, dicyclomine, hyoscyamine, promethazine    Assessment/Plan:   1. A Fib. Now in NSR. On PO Betapace. 2. CAD. On Aspirin. 3. DM. Continue Lantus and SSI coverage. 4. HTN. On Lisinopril. 5. Hyperlipidemia. On Lipitor and Fenofibrate. 6. Epigastric pain/IBS. As per Dr Carol Garsia to continue Protonix and Levsin. 7. Consult Dr Morales Holley for abnormal thyroid testing.           Jonas Whatley MD  6/18/2019 7:41 AM

## 2019-06-18 NOTE — CONSULTS
98.1 °F (36.7 °C) (Oral)   Resp 23   Ht 5' 9\" (1.753 m)   Wt 212 lb 9.6 oz (96.4 kg)   SpO2 97%   BMI 31.40 kg/m²     CONSTITUTIONAL:  awake, alert, cooperative, appears stated age   EYES:  vision intact Fundoscopic Exam not performed   ENT:Normal  NECK:  Supple, No   Thyroid :  nodular goiter:Normal   LUNGS:  Has Vesicular Breath Sounds,   CARDIOVASCULAR: Atrial fibrillation aBDOMEN:  No scars, normal bowel sounds, soft, non-distended, non-tender, no masses palpated, no hepatolienomegaly  Musculoskeletal: Normal  Extremities: Normal, peripheral pulses normal, , has no edema   NEUROLOGIC:  Awake, alert, oriented to name, place and time. Cranial nerves II-XII are grossly intact. Motor is  intact. Sensory is intact. ,  and gait is normal.    DATA:    CBC:   Recent Labs     06/16/19 0447   WBC 8.3   HGB 12.8       CMP:  Recent Labs     06/16/19  0447      K 4.0      CO2 23   BUN 15   CREATININE 0.6   CALCIUM 9.2   PROT 6.8   LABALBU 4.2   BILITOT 0.4   ALKPHOS 83   AST 27   ALT 35     Lipids:   Lab Results   Component Value Date    CHOL 180 01/25/2016    HDL 22 01/25/2016    TRIG 246 01/25/2016     Glucose:   Recent Labs     06/17/19  1726 06/17/19  2040 06/18/19  0727   POCGLU 192* 134* 138*     Hemoglobin A1C:   Lab Results   Component Value Date    LABA1C 7.3 06/15/2019     Free T4:   Lab Results   Component Value Date    T4FREE 1.29 06/15/2019     Free T3: No results found for: FT3  TSH High Sensitivity:   Lab Results   Component Value Date    TSHHS <0.010 06/16/2019       Xr Chest Portable    Result Date: 6/15/2019  EXAMINATION: ONE XRAY VIEW OF THE CHEST 6/15/2019 3:43 am COMPARISON: 01/24/2016. HISTORY: ORDERING SYSTEM PROVIDED HISTORY: chest pain d    No evidence for acute cardiopulmonary process.      Nm Myocardial Spect Rest Exercise Or Rx    Result Date: 6/17/2019  Cardiac Perfusion Imaging   Demographics   Patient Name      Nick Gallegos        Date of study        06/17/2019   Date of Birth     1955         Gender               Female   Age               58 year(s)         Race                 Unknown   Patient Number    [id number]         Room Number          CATH   Visit Number      [id number]          Height               69 inches   Corporate ID      [id number]           Weight               210 pounds   Accession Number  [id number]                                        NM Technologist      Magdy Dior                                                            RT                                                            Ean Yu MD  Physician         MD                 Cardiologist   Conclusions   Summary  No EKG changes suggestive of ischemia with Lexiscan infusion. Moderate reversible perfusion defect in the infero-lateral wall  normal perfusion uptake in the anterior/septal  gating show EF 73%   Signatures   ------------------------------------------------------------------  Electronically signed by Joselin Pelaez MD (Interpreting  cardiologist) on 06/17/2019 at 13:20  ------------------------------------------------------------------  Procedure Admit Source:Emergency department. Procedure Type:   Nuclear Stress Test:Pharmacological, Myocardial Perfusion Imaging with  Pharm, NM MYOCARDIAL SPECT REST EXERCISE OR RX  Indications: Chest pain. Risk Factors   The patient risk factors include:hypercholesterolemia, treated hypertension,  diabetes mellitus, last creatinine: 1.6 mg/dl, dyslipidemia and creatinine  clearance: 53.42 ml/min. Stress Protocols   Resting HR:78 bpm  Resting BP:150/77 mmHg  Stress Protocol:Pharmacologic - Lexiscan  Peak HR:95 bpm                      HR/BP product:19597  Peak BP:150/77 mmHg  Predicted HR: 156 bpm  % of predicted HR: 61   Exercise duration: 01:00 min   Symptoms  No symptoms with Lexiscan infusion. Complications  Procedure complication was none.    Stress Interpretation  No EKG changes suggestive of ischemia with Lexiscan infusion. Procedure Medications   - Lexiscan I.V. bolus (over 15sec.) 0.4 mg admininstered @ 06/17/2019 09:35. Imaging Protocols   Rest                             Stress   Isotope:Sestamibi 99mTc          Isotope: Sestamibi 99mTc  Isotope dose:10.6 mCi            Isotope dose:31 mCi  Administration route: I.V. Administration route: I.V. Injection Date:06/17/2019 08:05  Injection Date:06/17/2019 09:35  Scan Date:06/17/2019 08:50       Scan Date:06/17/2019 10:20   Technique:        SPECT          Technique:        Gated                                                     SPECT   Perfusion Interpretation   Moderate reversible perfusion defect in the infero-lateral wall  normal perfusion uptake in the anterior/septal  gating show EF 73%  Imaging Results    Summed scores     - Summed stress score: 9     - Summed rest score: 4     - Summed difference score:    5   Rest ejection  Ejection fraction:73 %  EDV :71 ml  ESV :19 ml  Stroke volume :52 ml  Medical History   Accession#:  [id number]  Admission Data Admission date: 06/15/2019 Admission Time: 03:22 Hospital Status: Inpatient.       Scheduled Medicines   Medications:    sodium chloride flush  10 mL Intravenous 2 times per day    sotalol  40 mg Oral BID    lisinopril  5 mg Oral Daily    aspirin  81 mg Oral Daily    atorvastatin  20 mg Oral Daily    docusate sodium  100 mg Oral Daily    fluticasone  2 spray Nasal Daily    pantoprazole  40 mg Oral QAM AC    polycarbophil  625 mg Oral Daily    zinc sulfate  220 mg Oral Daily    sodium chloride flush  10 mL Intravenous 2 times per day    insulin lispro  0-6 Units Subcutaneous TID WC    insulin lispro  0-3 Units Subcutaneous Nightly    insulin glargine  12 Units Subcutaneous BID    fenofibrate micronized  134 mg Oral Daily with breakfast      Infusions:    dextrose           IMPRESSION    Patient Active Problem List   Diagnosis    DM type 2 (diabetes mellitus, type 2) [facility] [address]    Hypercholesterolemia    HTN (hypertension)    Idiopathic acute pancreatitis    Acute pancreatitis    Type 2 diabetes mellitus without complication (HCC)    Essential hypertension    S/P laparoscopic cholecystectomy    Atrial fibrillation with rapid ventricular response (HCC)    Abnormal TSH    Epigastric pain         RECOMMENDATIONS:      1. Reviewed POC blood glucose . Labs and X ray results   2. Reviewed Home and Current Medicines   3. Will futher Thyroid studies ibnckuding Free T 4, Free  T3 and LATS  And antithyroid antibodies  4. Ultrasound of thyroid gland  5. Considered treating with beta-blocker her atrial fib. Will follow with you  Again thank you for sharing pt's care with me.      Truly yours,       Ayan Reyna MD

## 2019-06-19 VITALS
TEMPERATURE: 97.6 F | BODY MASS INDEX: 31.25 KG/M2 | HEIGHT: 69 IN | OXYGEN SATURATION: 96 % | WEIGHT: 211 LBS | DIASTOLIC BLOOD PRESSURE: 84 MMHG | HEART RATE: 61 BPM | RESPIRATION RATE: 18 BRPM | SYSTOLIC BLOOD PRESSURE: 119 MMHG

## 2019-06-19 LAB
ANION GAP SERPL CALCULATED.3IONS-SCNC: 12 MMOL/L (ref 4–16)
ANTITHYROID MICORSOMAL: 127.9 IU/ML (ref 0–9)
ANTITHYROID MICORSOMAL: ABNORMAL IU/ML (ref 0–9)
BUN BLDV-MCNC: 13 MG/DL (ref 6–23)
CALCIUM SERPL-MCNC: 9.2 MG/DL (ref 8.3–10.6)
CHLORIDE BLD-SCNC: 104 MMOL/L (ref 99–110)
CO2: 24 MMOL/L (ref 21–32)
CREAT SERPL-MCNC: 0.6 MG/DL (ref 0.6–1.1)
EKG ATRIAL RATE: 61 BPM
EKG ATRIAL RATE: 61 BPM
EKG DIAGNOSIS: NORMAL
EKG DIAGNOSIS: NORMAL
EKG P AXIS: 35 DEGREES
EKG P AXIS: 35 DEGREES
EKG P-R INTERVAL: 156 MS
EKG P-R INTERVAL: 156 MS
EKG Q-T INTERVAL: 426 MS
EKG Q-T INTERVAL: 426 MS
EKG QRS DURATION: 78 MS
EKG QRS DURATION: 78 MS
EKG QTC CALCULATION (BAZETT): 428 MS
EKG QTC CALCULATION (BAZETT): 428 MS
EKG R AXIS: 13 DEGREES
EKG R AXIS: 13 DEGREES
EKG T AXIS: 26 DEGREES
EKG T AXIS: 26 DEGREES
EKG VENTRICULAR RATE: 61 BPM
EKG VENTRICULAR RATE: 61 BPM
GFR AFRICAN AMERICAN: >60 ML/MIN/1.73M2
GFR NON-AFRICAN AMERICAN: >60 ML/MIN/1.73M2
GLUCOSE BLD-MCNC: 136 MG/DL (ref 70–99)
GLUCOSE BLD-MCNC: 140 MG/DL (ref 70–99)
GLUCOSE BLD-MCNC: 204 MG/DL (ref 70–99)
GLUCOSE BLD-MCNC: 268 MG/DL (ref 70–99)
HCT VFR BLD CALC: 39.2 % (ref 37–47)
HEMOGLOBIN: 12.1 GM/DL (ref 12.5–16)
MCH RBC QN AUTO: 26.5 PG (ref 27–31)
MCHC RBC AUTO-ENTMCNC: 30.9 % (ref 32–36)
MCV RBC AUTO: 85.8 FL (ref 78–100)
PDW BLD-RTO: 12.7 % (ref 11.7–14.9)
PLATELET # BLD: 200 K/CU MM (ref 140–440)
PMV BLD AUTO: 9.3 FL (ref 7.5–11.1)
POTASSIUM SERPL-SCNC: 4 MMOL/L (ref 3.5–5.1)
RBC # BLD: 4.57 M/CU MM (ref 4.2–5.4)
SODIUM BLD-SCNC: 140 MMOL/L (ref 135–145)
T3 FREE: 3.9 PG/ML (ref 2.3–4.2)
THYROID STIMULATING IMMUNOGLOBULIN: 332 %
THYROID STIMULATING IMMUNOGLOBULIN: ABNORMAL %
WBC # BLD: 4.5 K/CU MM (ref 4–10.5)

## 2019-06-19 PROCEDURE — 93005 ELECTROCARDIOGRAM TRACING: CPT | Performed by: INTERNAL MEDICINE

## 2019-06-19 PROCEDURE — 82962 GLUCOSE BLOOD TEST: CPT

## 2019-06-19 PROCEDURE — 94761 N-INVAS EAR/PLS OXIMETRY MLT: CPT

## 2019-06-19 PROCEDURE — 85027 COMPLETE CBC AUTOMATED: CPT

## 2019-06-19 PROCEDURE — 80048 BASIC METABOLIC PNL TOTAL CA: CPT

## 2019-06-19 PROCEDURE — 36415 COLL VENOUS BLD VENIPUNCTURE: CPT

## 2019-06-19 PROCEDURE — 93010 ELECTROCARDIOGRAM REPORT: CPT | Performed by: INTERNAL MEDICINE

## 2019-06-19 PROCEDURE — 6370000000 HC RX 637 (ALT 250 FOR IP): Performed by: INTERNAL MEDICINE

## 2019-06-19 RX ORDER — DICYCLOMINE HCL 20 MG
20 TABLET ORAL EVERY 6 HOURS PRN
COMMUNITY
End: 2019-09-13 | Stop reason: ALTCHOICE

## 2019-06-19 RX ORDER — SOTALOL HYDROCHLORIDE 80 MG/1
40 TABLET ORAL 2 TIMES DAILY
Qty: 60 TABLET | Refills: 1 | Status: SHIPPED | OUTPATIENT
Start: 2019-06-19

## 2019-06-19 RX ADMIN — ACETAMINOPHEN 650 MG: 325 TABLET ORAL at 05:56

## 2019-06-19 RX ADMIN — TRAMADOL HYDROCHLORIDE 50 MG: 50 TABLET, FILM COATED ORAL at 09:04

## 2019-06-19 RX ADMIN — ACETAMINOPHEN 650 MG: 325 TABLET ORAL at 01:20

## 2019-06-19 RX ADMIN — PANTOPRAZOLE SODIUM 40 MG: 40 TABLET, DELAYED RELEASE ORAL at 05:47

## 2019-06-19 RX ADMIN — ZINC SULFATE 220 MG (50 MG) CAPSULE 220 MG: CAPSULE at 09:01

## 2019-06-19 RX ADMIN — SOTALOL HYDROCHLORIDE 40 MG: 80 TABLET ORAL at 09:01

## 2019-06-19 RX ADMIN — INSULIN GLARGINE 12 UNITS: 100 INJECTION, SOLUTION SUBCUTANEOUS at 09:08

## 2019-06-19 RX ADMIN — CALCIUM POLYCARBOPHIL 625 MG TABLET 625 MG: at 09:01

## 2019-06-19 RX ADMIN — FENOFIBRATE 134 MG: 134 CAPSULE ORAL at 09:00

## 2019-06-19 ASSESSMENT — PAIN SCALES - GENERAL
PAINLEVEL_OUTOF10: 3
PAINLEVEL_OUTOF10: 0
PAINLEVEL_OUTOF10: 3
PAINLEVEL_OUTOF10: 0
PAINLEVEL_OUTOF10: 6

## 2019-06-19 ASSESSMENT — PAIN DESCRIPTION - DESCRIPTORS: DESCRIPTORS: ACHING

## 2019-06-19 ASSESSMENT — PAIN DESCRIPTION - LOCATION: LOCATION: GENERALIZED

## 2019-06-19 ASSESSMENT — PAIN DESCRIPTION - PAIN TYPE: TYPE: CHRONIC PAIN
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
## 2019-06-19 NOTE — PLAN OF CARE
Problem: Pain:  Goal: Pain level will decrease  Description  Pain level will decrease  Outcome: Ongoing  Goal: Control of acute pain  Description  Control of acute pain  Outcome: Ongoing  Goal: Control of chronic pain  Description  Control of chronic pain  Outcome: Ongoing     Problem:  Bowel/Gastric:  Goal: Control of bowel function will improve  Description  Control of bowel function will improve  Outcome: Ongoing  Goal: Ability to achieve a regular elimination pattern will improve  Description  Ability to achieve a regular elimination pattern will improve  Outcome: Ongoing     Problem: Nutritional:  Goal: Ability to follow a diet with enough fiber (20 to 30 grams) for normal bowel function will improve  Description  Ability to follow a diet with enough fiber (20 to 30 grams) for normal bowel function will improve  Outcome: Ongoing     Problem: Skin Integrity:  Goal: Risk for impaired skin integrity will decrease  Description  Risk for impaired skin integrity will decrease  Outcome: Ongoing     Problem: Cardiac Output - Decreased:  Goal: Hemodynamic stability will improve  Description  Hemodynamic stability will improve  Outcome: Ongoing     Problem: Infection:  Goal: Will remain free from infection  Description  Will remain free from infection  Outcome: Ongoing     Problem: Safety:  Goal: Free from accidental physical injury  Description  Free from accidental physical injury  Outcome: Ongoing  Goal: Free from intentional harm  Description  Free from intentional harm  Outcome: Ongoing     Problem: Daily Care:  Goal: Daily care needs are met  Description  Daily care needs are met  Outcome: Ongoing     Problem: Discharge Planning:  Goal: Patients continuum of care needs are met  Description  Patients continuum of care needs are met  Outcome: Ongoing

## 2019-06-19 NOTE — PROGRESS NOTES
spray Nasal Daily    pantoprazole  40 mg Oral QAM AC    polycarbophil  625 mg Oral Daily    zinc sulfate  220 mg Oral Daily    sodium chloride flush  10 mL Intravenous 2 times per day    insulin lispro  0-6 Units Subcutaneous TID WC    insulin lispro  0-3 Units Subcutaneous Nightly    insulin glargine  12 Units Subcutaneous BID    fenofibrate micronized  134 mg Oral Daily with breakfast     PRN Meds: cyclobenzaprine, sodium chloride flush, acetaminophen, diphenhydrAMINE, sodium chloride flush, magnesium hydroxide, ondansetron, glucose, dextrose, glucagon (rDNA), dextrose, traMADol, morphine, dicyclomine, hyoscyamine, promethazine    Assessment/Plan:   1. A Fib. Now in NSR. On PO Betapace. 2. CAD. On Aspirin. 3. DM. Continue Lantus and SSI coverage. 4. HTN. On Lisinopril. 5. Hyperlipidemia. On Lipitor and Fenofibrate. 6. Epigastric pain/IBS. As per Dr Satish Antonio to continue Protonix and Levsin. 7. R/O Thyroiditis. As per Dr Kandy Martin. 8. DC to home today.             Bogdan Dominguez MD  6/19/2019 8:07 AM

## 2019-06-19 NOTE — DISCHARGE SUMMARY
Ingrid Jones  Discharge Summary     Patient ID  Anam Joyce   1955  [id number]          Admit date: 6/15/2019   Discharge date: 6/19/2019      Admitting Physician: Guido Martin MD   Discharge Physician: Guido Martin MD    Discharge Diagnoses:     1. A Fib. Treated with IV Cardizem. Now in NSR. DC home on PO Betapace. 2. CAD. On Aspirin. 3. DM. Continue Lantus and SSI coverage. 4. HTN. On Lisinopril. 5. Hyperlipidemia. On Lipitor and Fenofibrate. 6. Epigastric pain/IBS. As per Dr Santiago Robledo to continue Protonix and Levsin. 7. R/O Thyroiditis. Elevated Antithyroid microsomal and Thyroid stimulating immunoglobulin. Low TSH, normal free T3 and T4. Follow up with  Dr Flavia Matamoros. Discharged Condition: good    Hospital Course:   Pt was admitted with A fib RVR. Started on IV Cardizem. Converted to NSR. Seen by Cardiology, GI and Endo. ECHO, Stress test and cath done. No CAD noted. Medical tx recommended. No new rec from GI in regards to chronic epigastric pain. Seen by Endo due to abnormal TFT. Possible has thyroiditis. Dr Flavia Matamoros rec to continue with beta blocklers. Consults:     Cardiology (Dr Leander Phillips), GI (Dr Santiago Robledo)  and Endocrinology (Dr Flavia Matamoros). Significant Diagnostic Studies:   Us Head Neck Soft Tissue Thyroid    Result Date: 6/18/2019  EXAMINATION: THYROID ULTRASOUND 6/18/2019 COMPARISON: None. HISTORY: ORDERING SYSTEM PROVIDED HISTORY: THYROID DISEASE TECHNOLOGIST PROVIDED HISTORY: Acuity: Acute FINDINGS: Right thyroid lobe:  4.2 x 1.7 x 1.5 cm Left thyroid lobe:  3.9 x 1.9 x 1.8 cm Isthmus:  3.2 mm Thyroid Gland: The thyroid gland is diffusely heterogeneous in echotexture and demonstrates mild hyperemia. Nodules: No discrete thyroid nodule or mass is identified. Cervical lymphadenopathy: No abnormal lymph nodes in the imaged portions of the neck.      1. Diffusely heterogeneous and mildly hyperemic appearance of the thyroid gland is most consistent with a nonspecific thyroiditis. Clinical correlation is advised. 2. No discrete thyroid nodule or mass is identified. Xr Chest Portable    Result Date: 6/15/2019  EXAMINATION: ONE XRAY VIEW OF THE CHEST 6/15/2019 3:43 am COMPARISON: 01/24/2016. HISTORY: ORDERING SYSTEM PROVIDED HISTORY: chest pain TECHNOLOGIST PROVIDED HISTORY: Reason for exam:->chest pain Ordering Physician Provided Reason for Exam: chest pain Acuity: Unknown Type of Exam: Unknown FINDINGS: Lungs are clear. Cardiac and mediastinal silhouettes are within normal limits. No pneumothoraces. Bony structures appear intact. No evidence for acute cardiopulmonary process. Nm Myocardial Spect Rest Exercise Or Rx    Result Date: 6/17/2019. Conclusions        Summary    No EKG changes suggestive of ischemia with Lexiscan infusion.    Moderate reversible perfusion defect in the infero-lateral wall    normal perfusion uptake in the anterior/septal    gating show EF 73%       ECHO:    Conclusions      Summary   Left ventricular function is normal, EF is estimated at 50 %.  Mild left ventricular hypertrophy.   Grade II diastolic dysfunction.   No significant valvular disease noted.   No evidence of pericardial effusion.       Cardiac cath:    IMPRESSION:  1. Left main is patent. 2.  LAD, circ, ramus, and RCA all have mild coronary artery disease  present. No obstruction lesion noted. EDP is normal.  The plan at this  time is medical treatment.     The patient tolerated the procedure well. No complications noted.           Recent Results (from the past 24 hour(s))   POCT Glucose    Collection Time: 06/18/19 12:15 PM   Result Value Ref Range    POC Glucose 143 (H) 70 - 99 MG/DL   POCT Glucose    Collection Time: 06/18/19  5:01 PM   Result Value Ref Range    POC Glucose 130 (H) 70 - 99 MG/DL   POCT Glucose    Collection Time: 06/18/19  9:20 PM   Result Value Ref Range    POC Glucose 147 (H) 70 - 99 MG/DL   POCT Glucose    Collection Time: 06/19/19  7:55 AM

## 2019-06-19 NOTE — CONSULTS
Patient seen for diabetic educational needs. Mireya Lam is a 58y.o. year old female admitted with Atrial Fib  Patient Active Problem List   Diagnosis    DM type 2 (diabetes mellitus, type 2) [facility] [address]    Hypercholesterolemia    HTN (hypertension)    Idiopathic acute pancreatitis    Acute pancreatitis    Type 2 diabetes mellitus without complication ([facility] [address]    Essential hypertension    S/P laparoscopic cholecystectomy    Atrial fibrillation with rapid ventricular response (HCC)    Abnormal TSH    Epigastric pain      HgBA1c:    Lab Results   Component Value Date    LABA1C 7.3 06/15/2019     Seen on previous admission for DM Ed. Concern that she now has Heart problems and \"Thyroid problems\"  Plans FU with Dr Janessa Johnson and Dr Buddy Deluca. States that affording medications has been an issue in the past but now has insurance. Wants to attend Diabetes education as outpt. Diabetes Self Management education provided.:  · Insulin administration: Takes NPH 12 units twice a day at home Uses sliding scale Novolog for BG > 150. States that she rarely has BG > 150 mg/dl. Has insulin and injection supplies at home  · Glucose Monitoring:Verbalized understanding of BG and A1C targets. Knows when to call PCP. Verbalized importance of BG control and heart disease. · Meal planning: Reviewed importance of eating three meals per day and plate method for consistent carb intake. Printed information \"Carbohydratre Counting For People With Diabetes\" provided  Verbalized understanding. · Hypoglycemia: Reviewed symptoms, prevention and treatment. Verbalized understanding. · Hyperglycemia:  Reviewed symptoms, prevention and treatment. Verbalized understanding. Printed information Diabetes,  contact number for Diabetes Educator and resources for ongoing education and support provided. Carol Huynh RN, BSN, CDE

## 2019-06-20 LAB
THYROTROPIN BINDING INHIBITORY IMMUNOGLOBULIN: 1.31 IU/L
THYROTROPIN BINDING INHIBITORY IMMUNOGLOBULIN: NORMAL IU/L

## 2019-07-26 ENCOUNTER — HOSPITAL ENCOUNTER (OUTPATIENT)
Dept: SLEEP CENTER | Age: 64
Discharge: HOME | End: 2019-07-26
Payer: COMMERCIAL

## 2019-07-26 VITALS
HEART RATE: 73 BPM | WEIGHT: 212.2 LBS | OXYGEN SATURATION: 96 % | SYSTOLIC BLOOD PRESSURE: 137 MMHG | BODY MASS INDEX: 31.43 KG/M2 | DIASTOLIC BLOOD PRESSURE: 79 MMHG | HEIGHT: 69 IN

## 2019-07-26 DIAGNOSIS — G47.10 HYPERSOMNOLENCE: Primary | ICD-10-CM

## 2019-07-26 DIAGNOSIS — R06.83 SNORING: ICD-10-CM

## 2019-07-26 PROCEDURE — 99211 OFF/OP EST MAY X REQ PHY/QHP: CPT | Performed by: INTERNAL MEDICINE

## 2019-07-26 RX ORDER — METFORMIN HYDROCHLORIDE 500 MG/1
TABLET, EXTENDED RELEASE ORAL
Refills: 11 | COMMUNITY
Start: 2019-06-24 | End: 2019-07-26 | Stop reason: SDUPTHER

## 2019-07-26 RX ORDER — CYCLOBENZAPRINE HCL 10 MG
TABLET ORAL
Refills: 2 | COMMUNITY
Start: 2019-06-14

## 2019-07-26 RX ORDER — FENOFIBRATE 134 MG/1
CAPSULE ORAL
Refills: 6 | COMMUNITY
Start: 2019-06-24

## 2019-07-26 RX ORDER — APIXABAN 5 MG/1
TABLET, FILM COATED ORAL
Refills: 11 | COMMUNITY
Start: 2019-07-09

## 2019-07-26 ASSESSMENT — SLEEP AND FATIGUE QUESTIONNAIRES
HOW LIKELY ARE YOU TO NOD OFF OR FALL ASLEEP WHILE LYING DOWN TO REST IN THE AFTERNOON WHEN CIRCUMSTANCES PERMIT: 2
HOW LIKELY ARE YOU TO NOD OFF OR FALL ASLEEP WHILE SITTING INACTIVE IN A PUBLIC PLACE: 0
HOW LIKELY ARE YOU TO NOD OFF OR FALL ASLEEP WHILE SITTING QUIETLY AFTER LUNCH WITHOUT ALCOHOL: 0
HOW LIKELY ARE YOU TO NOD OFF OR FALL ASLEEP WHILE WATCHING TV: 1
HOW LIKELY ARE YOU TO NOD OFF OR FALL ASLEEP WHILE SITTING AND TALKING TO SOMEONE: 0
ESS TOTAL SCORE: 5
HOW LIKELY ARE YOU TO NOD OFF OR FALL ASLEEP WHEN YOU ARE A PASSENGER IN A CAR FOR AN HOUR WITHOUT A BREAK: 1
HOW LIKELY ARE YOU TO NOD OFF OR FALL ASLEEP IN A CAR, WHILE STOPPED FOR A FEW MINUTES IN TRAFFIC: 0
HOW LIKELY ARE YOU TO NOD OFF OR FALL ASLEEP WHILE SITTING AND READING: 1
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
## 2019-07-30 ENCOUNTER — HOSPITAL ENCOUNTER (OUTPATIENT)
Dept: ULTRASOUND IMAGING | Age: 64
Discharge: HOME | End: 2019-07-30
Payer: COMMERCIAL

## 2019-07-30 ENCOUNTER — HOSPITAL ENCOUNTER (OUTPATIENT)
Dept: NUCLEAR MEDICINE | Age: 64
Discharge: HOME | End: 2019-07-30
Payer: COMMERCIAL

## 2019-07-30 DIAGNOSIS — E03.9 HYPOTHYROIDISM, UNSPECIFIED TYPE: ICD-10-CM

## 2019-07-30 PROCEDURE — 78014 THYROID IMAGING W/BLOOD FLOW: CPT

## 2019-07-30 PROCEDURE — 3430000000 HC RX DIAGNOSTIC RADIOPHARMACEUTICAL: Performed by: INTERNAL MEDICINE

## 2019-07-30 PROCEDURE — A9516 IODINE I-123 SOD IODIDE MIC: HCPCS | Performed by: INTERNAL MEDICINE

## 2019-07-30 RX ADMIN — SODIUM IODIDE I 123 234 MICRO CURIE: 100 CAPSULE, GELATIN COATED ORAL at 09:30

## 2019-07-31 ENCOUNTER — HOSPITAL ENCOUNTER (OUTPATIENT)
Dept: NUCLEAR MEDICINE | Age: 64
Discharge: HOME | End: 2019-07-31
Payer: COMMERCIAL

## 2019-08-08 ENCOUNTER — HOSPITAL ENCOUNTER (OUTPATIENT)
Dept: DIABETES SERVICES | Age: 64
Setting detail: THERAPIES SERIES
Discharge: HOME | End: 2019-08-08
Payer: COMMERCIAL

## 2019-08-08 VITALS — BODY MASS INDEX: 30.81 KG/M2 | WEIGHT: 208 LBS | HEIGHT: 69 IN

## 2019-08-08 PROCEDURE — G0109 DIAB MANAGE TRN IND/GROUP: HCPCS

## 2019-08-08 RX ORDER — HYOSCYAMINE SULFATE 0.125 MG
125 TABLET ORAL EVERY 4 HOURS PRN
COMMUNITY

## 2019-08-08 SDOH — ECONOMIC STABILITY: FOOD INSECURITY: ADDITIONAL INFORMATION: NO
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
## 2019-08-14 ENCOUNTER — HOSPITAL ENCOUNTER (OUTPATIENT)
Dept: SLEEP CENTER | Age: 64
Discharge: HOME | End: 2019-08-14
Payer: COMMERCIAL

## 2019-08-14 PROCEDURE — 95810 POLYSOM 6/> YRS 4/> PARAM: CPT | Performed by: INTERNAL MEDICINE

## 2019-09-13 ENCOUNTER — HOSPITAL ENCOUNTER (OUTPATIENT)
Dept: SLEEP CENTER | Age: 64
Discharge: HOME | End: 2019-09-13
Payer: COMMERCIAL

## 2019-09-13 DIAGNOSIS — G47.33 OSA (OBSTRUCTIVE SLEEP APNEA): Primary | ICD-10-CM

## 2019-09-13 PROCEDURE — 9990000010 HC NO CHARGE VISIT: Performed by: INTERNAL MEDICINE

## 2019-10-11 ENCOUNTER — HOSPITAL ENCOUNTER (OUTPATIENT)
Dept: SLEEP CENTER | Age: 64
Discharge: HOME | End: 2019-10-11
Payer: COMMERCIAL

## 2019-10-11 DIAGNOSIS — G47.33 OSA (OBSTRUCTIVE SLEEP APNEA): ICD-10-CM

## 2019-10-11 PROCEDURE — 95811 POLYSOM 6/>YRS CPAP 4/> PARM: CPT

## 2019-10-18 LAB — STATUS: NORMAL

## 2020-03-05 ENCOUNTER — TELEPHONE (OUTPATIENT)
Dept: DIABETES SERVICES | Age: 65
End: 2020-03-05

## 2020-03-05 NOTE — LETTER
Dear Quynh Hunt  1955            I have been unable to reach you by telephone in an attempt to complete a follow-up assessment for Diabetes Class. Please let us know how you are doing. Please print and complete information at your convenience and return to:    ATTN: Diabetes Education  [address] [place], [address] [facility] [place]    Or call Diabetes Education Team at:  [phone]    Thank You! Please let us know your most recent:  Pre-Meal Blood Sugar Range__________      Post Meal Blood Sugar Range__________    Weight_______       Hgb A1C (Estimated Average Blood Sugar)_________                 Diabetes Self Management Behaviors  Morgan the number that most closely answers the question with:  (100% All the time)   (75% Most of the time)  (50% Half of the Time)   (25% Occasionally)  (0% Never)                                                                     I eat my meals on time 100% 75% 50% 25% 0%   I read the food labels  100% 75% 50% 25% 0%   I eat a specific amount of carbohydrates at each meal and snacks 100% 75% 50% 25% 0%   I am taking my medications at the right time  100% 75% 50% 25% 0%   I inspect my feet daily 100% 75% 50% 25% 0%   I have had a yearly dilated eye exam Yes    or    No        I have had or plan to have a flu shot this year               Yes    or    No        I have quit smoking                       Yes    or    No    or   N/A not a smoker            I check my blood sugar  Daily/ 2 -3 times daily/ 4 times daily/Not testing/Other___  Dental:   I get a Dental Exam every 6 months No/Yes  Exercise  What exercise are you doing?_____________ How Long each time____minutes  How many days each week?______________  Medications    Has your diabetes medication changed? No/Yes  specify______________________________  Follow Up Appointments  Do you have a follow-up diabetes appointment scheduled with any of the following? ____Doctor    ____Nurse Diabetes Educator     ____Dietitian         ____Other:_____________    What parts of Diabetes Self Management do you feel you need to learn more about?  ______________________________________________________________________  I am interested in Follow-up classes Yes/No  Todays Date________    Reminder:    Diabetes Support Group meets the 2nd Thursday of the month at 6:00 PM                              [address] [phone]  Other Resurces:   [email or web address] [email or web address]        Thank you for allowing us to help you manage your diabetes!     Diabetes Education Team  [phone]